# Patient Record
Sex: FEMALE | Race: WHITE | NOT HISPANIC OR LATINO | Employment: OTHER | URBAN - METROPOLITAN AREA
[De-identification: names, ages, dates, MRNs, and addresses within clinical notes are randomized per-mention and may not be internally consistent; named-entity substitution may affect disease eponyms.]

---

## 2022-01-05 DIAGNOSIS — Z11.59 ENCOUNTER FOR SCREENING FOR OTHER VIRAL DISEASES: ICD-10-CM

## 2022-01-27 RX ORDER — AMLODIPINE BESYLATE 2.5 MG/1
2.5 TABLET ORAL DAILY
Status: ON HOLD | COMMUNITY
End: 2023-02-16

## 2022-01-27 RX ORDER — LISINOPRIL 40 MG/1
40 TABLET ORAL DAILY
Status: ON HOLD | COMMUNITY
End: 2023-02-16

## 2022-01-27 RX ORDER — ATENOLOL 100 MG/1
100 TABLET ORAL DAILY
Status: ON HOLD | COMMUNITY
End: 2023-02-16

## 2022-01-28 ENCOUNTER — TRANSFERRED RECORDS (OUTPATIENT)
Dept: HEALTH INFORMATION MANAGEMENT | Facility: CLINIC | Age: 72
End: 2022-01-28
Payer: COMMERCIAL

## 2022-01-28 LAB — INR (EXTERNAL): 1.1

## 2022-01-31 ENCOUNTER — ANESTHESIA EVENT (OUTPATIENT)
Dept: SURGERY | Facility: CLINIC | Age: 72
End: 2022-01-31
Payer: COMMERCIAL

## 2022-01-31 ASSESSMENT — MIFFLIN-ST. JEOR: SCORE: 1378.64

## 2022-02-01 ENCOUNTER — HOSPITAL ENCOUNTER (OUTPATIENT)
Facility: CLINIC | Age: 72
Discharge: HOME | End: 2022-02-01
Attending: INTERNAL MEDICINE | Admitting: INTERNAL MEDICINE
Payer: COMMERCIAL

## 2022-02-01 ENCOUNTER — ANESTHESIA (OUTPATIENT)
Dept: SURGERY | Facility: CLINIC | Age: 72
End: 2022-02-01
Payer: COMMERCIAL

## 2022-02-01 VITALS
HEART RATE: 81 BPM | HEIGHT: 63 IN | DIASTOLIC BLOOD PRESSURE: 70 MMHG | TEMPERATURE: 97.5 F | SYSTOLIC BLOOD PRESSURE: 116 MMHG | OXYGEN SATURATION: 95 % | RESPIRATION RATE: 18 BRPM | WEIGHT: 192.3 LBS | BODY MASS INDEX: 34.07 KG/M2

## 2022-02-01 DIAGNOSIS — K21.00 GASTROESOPHAGEAL REFLUX DISEASE WITH ESOPHAGITIS WITHOUT HEMORRHAGE: Primary | ICD-10-CM

## 2022-02-01 LAB
COLONOSCOPY: NORMAL
UPPER GI ENDOSCOPY: NORMAL

## 2022-02-01 PROCEDURE — 250N000011 HC RX IP 250 OP 636: Performed by: NURSE ANESTHETIST, CERTIFIED REGISTERED

## 2022-02-01 PROCEDURE — 360N000075 HC SURGERY LEVEL 2, PER MIN: Performed by: INTERNAL MEDICINE

## 2022-02-01 PROCEDURE — 258N000003 HC RX IP 258 OP 636: Performed by: ANESTHESIOLOGY

## 2022-02-01 PROCEDURE — 258N000003 HC RX IP 258 OP 636: Performed by: NURSE ANESTHETIST, CERTIFIED REGISTERED

## 2022-02-01 PROCEDURE — 999N000141 HC STATISTIC PRE-PROCEDURE NURSING ASSESSMENT: Performed by: INTERNAL MEDICINE

## 2022-02-01 PROCEDURE — 710N000012 HC RECOVERY PHASE 2, PER MINUTE: Performed by: INTERNAL MEDICINE

## 2022-02-01 PROCEDURE — 272N000001 HC OR GENERAL SUPPLY STERILE: Performed by: INTERNAL MEDICINE

## 2022-02-01 PROCEDURE — 370N000017 HC ANESTHESIA TECHNICAL FEE, PER MIN: Performed by: INTERNAL MEDICINE

## 2022-02-01 PROCEDURE — 88305 TISSUE EXAM BY PATHOLOGIST: CPT | Mod: TC | Performed by: INTERNAL MEDICINE

## 2022-02-01 PROCEDURE — 250N000009 HC RX 250: Performed by: NURSE ANESTHETIST, CERTIFIED REGISTERED

## 2022-02-01 RX ORDER — SODIUM CHLORIDE, SODIUM LACTATE, POTASSIUM CHLORIDE, CALCIUM CHLORIDE 600; 310; 30; 20 MG/100ML; MG/100ML; MG/100ML; MG/100ML
INJECTION, SOLUTION INTRAVENOUS CONTINUOUS
Status: DISCONTINUED | OUTPATIENT
Start: 2022-02-01 | End: 2022-02-01 | Stop reason: HOSPADM

## 2022-02-01 RX ORDER — OMEPRAZOLE 20 MG/1
20 TABLET, DELAYED RELEASE ORAL DAILY
Qty: 30 TABLET | Refills: 1 | Status: SHIPPED | OUTPATIENT
Start: 2022-02-01 | End: 2023-02-12

## 2022-02-01 RX ORDER — PROPOFOL 10 MG/ML
INJECTION, EMULSION INTRAVENOUS CONTINUOUS PRN
Status: DISCONTINUED | OUTPATIENT
Start: 2022-02-01 | End: 2022-02-01

## 2022-02-01 RX ORDER — DEXAMETHASONE SODIUM PHOSPHATE 10 MG/ML
INJECTION, SOLUTION INTRAMUSCULAR; INTRAVENOUS PRN
Status: DISCONTINUED | OUTPATIENT
Start: 2022-02-01 | End: 2022-02-01

## 2022-02-01 RX ORDER — ONDANSETRON 2 MG/ML
INJECTION INTRAMUSCULAR; INTRAVENOUS PRN
Status: DISCONTINUED | OUTPATIENT
Start: 2022-02-01 | End: 2022-02-01

## 2022-02-01 RX ORDER — LIDOCAINE 40 MG/G
CREAM TOPICAL
Status: DISCONTINUED | OUTPATIENT
Start: 2022-02-01 | End: 2022-02-01 | Stop reason: HOSPADM

## 2022-02-01 RX ORDER — SODIUM CHLORIDE 9 MG/ML
INJECTION, SOLUTION INTRAVENOUS CONTINUOUS PRN
Status: DISCONTINUED | OUTPATIENT
Start: 2022-02-01 | End: 2022-02-01

## 2022-02-01 RX ORDER — FENTANYL CITRATE 50 UG/ML
25 INJECTION, SOLUTION INTRAMUSCULAR; INTRAVENOUS EVERY 5 MIN PRN
Status: DISCONTINUED | OUTPATIENT
Start: 2022-02-01 | End: 2022-02-01 | Stop reason: HOSPADM

## 2022-02-01 RX ORDER — PROPOFOL 10 MG/ML
INJECTION, EMULSION INTRAVENOUS PRN
Status: DISCONTINUED | OUTPATIENT
Start: 2022-02-01 | End: 2022-02-01

## 2022-02-01 RX ORDER — LIDOCAINE HYDROCHLORIDE 10 MG/ML
INJECTION, SOLUTION INFILTRATION; PERINEURAL PRN
Status: DISCONTINUED | OUTPATIENT
Start: 2022-02-01 | End: 2022-02-01

## 2022-02-01 RX ORDER — HYDROMORPHONE HCL IN WATER/PF 6 MG/30 ML
0.2 PATIENT CONTROLLED ANALGESIA SYRINGE INTRAVENOUS EVERY 5 MIN PRN
Status: DISCONTINUED | OUTPATIENT
Start: 2022-02-01 | End: 2022-02-01 | Stop reason: HOSPADM

## 2022-02-01 RX ORDER — ONDANSETRON 2 MG/ML
4 INJECTION INTRAMUSCULAR; INTRAVENOUS EVERY 30 MIN PRN
Status: DISCONTINUED | OUTPATIENT
Start: 2022-02-01 | End: 2022-02-01 | Stop reason: HOSPADM

## 2022-02-01 RX ORDER — HALOPERIDOL 5 MG/ML
1 INJECTION INTRAMUSCULAR
Status: DISCONTINUED | OUTPATIENT
Start: 2022-02-01 | End: 2022-02-01 | Stop reason: HOSPADM

## 2022-02-01 RX ORDER — FENTANYL CITRATE 50 UG/ML
25 INJECTION, SOLUTION INTRAMUSCULAR; INTRAVENOUS
Status: CANCELLED | OUTPATIENT
Start: 2022-02-01

## 2022-02-01 RX ORDER — ONDANSETRON 4 MG/1
4 TABLET, ORALLY DISINTEGRATING ORAL EVERY 30 MIN PRN
Status: DISCONTINUED | OUTPATIENT
Start: 2022-02-01 | End: 2022-02-01 | Stop reason: HOSPADM

## 2022-02-01 RX ORDER — MEPERIDINE HYDROCHLORIDE 25 MG/ML
12.5 INJECTION INTRAMUSCULAR; INTRAVENOUS; SUBCUTANEOUS
Status: DISCONTINUED | OUTPATIENT
Start: 2022-02-01 | End: 2022-02-01 | Stop reason: HOSPADM

## 2022-02-01 RX ORDER — OXYCODONE HYDROCHLORIDE 5 MG/1
5 TABLET ORAL EVERY 4 HOURS PRN
Status: DISCONTINUED | OUTPATIENT
Start: 2022-02-01 | End: 2022-02-01 | Stop reason: HOSPADM

## 2022-02-01 RX ADMIN — SODIUM CHLORIDE, POTASSIUM CHLORIDE, SODIUM LACTATE AND CALCIUM CHLORIDE: 600; 310; 30; 20 INJECTION, SOLUTION INTRAVENOUS at 07:21

## 2022-02-01 RX ADMIN — PHENYLEPHRINE HYDROCHLORIDE 50 MCG: 10 INJECTION INTRAVENOUS at 08:13

## 2022-02-01 RX ADMIN — LIDOCAINE HYDROCHLORIDE 50 MG: 10 INJECTION, SOLUTION INFILTRATION; PERINEURAL at 07:30

## 2022-02-01 RX ADMIN — PHENYLEPHRINE HYDROCHLORIDE 50 MCG: 10 INJECTION INTRAVENOUS at 07:59

## 2022-02-01 RX ADMIN — DEXAMETHASONE SODIUM PHOSPHATE 10 MG: 10 INJECTION, SOLUTION INTRAMUSCULAR; INTRAVENOUS at 07:40

## 2022-02-01 RX ADMIN — SODIUM CHLORIDE: 9 INJECTION, SOLUTION INTRAVENOUS at 08:27

## 2022-02-01 RX ADMIN — PROPOFOL 150 MCG/KG/MIN: 10 INJECTION, EMULSION INTRAVENOUS at 07:35

## 2022-02-01 RX ADMIN — PHENYLEPHRINE HYDROCHLORIDE 50 MCG: 10 INJECTION INTRAVENOUS at 08:21

## 2022-02-01 RX ADMIN — PROPOFOL 20 MG: 10 INJECTION, EMULSION INTRAVENOUS at 07:41

## 2022-02-01 RX ADMIN — PHENYLEPHRINE HYDROCHLORIDE 50 MCG: 10 INJECTION INTRAVENOUS at 07:55

## 2022-02-01 RX ADMIN — ONDANSETRON 4 MG: 2 INJECTION INTRAMUSCULAR; INTRAVENOUS at 07:27

## 2022-02-01 ASSESSMENT — MIFFLIN-ST. JEOR: SCORE: 1366.4

## 2022-02-01 NOTE — ANESTHESIA POSTPROCEDURE EVALUATION
Patient: Florence Murray    Procedure: Procedure(s):  ESOPHAGOGASTRODUODENOSCOPY WITH BIOPSIES.  WITH COLONOSCOPY WITH POLYPECTOMY.       Diagnosis:Gastrointestinal hemorrhage [K92.2]  Diverticulitis [K57.92]  Diagnosis Additional Information: No value filed.    Anesthesia Type:  MAC    Note:  Disposition: Outpatient   Postop Pain Control: Uneventful            Sign Out: Well controlled pain   PONV: No   Neuro/Psych: Uneventful            Sign Out: Acceptable/Baseline neuro status   Airway/Respiratory: Uneventful            Sign Out: Acceptable/Baseline resp. status   CV/Hemodynamics: Uneventful            Sign Out: Acceptable CV status; No obvious hypovolemia; No obvious fluid overload   Other NRE: NONE   DID A NON-ROUTINE EVENT OCCUR? No           Last vitals:  Vitals Value Taken Time   /66 02/01/22 0900   Temp 36.4  C (97.5  F) 02/01/22 0840   Pulse 81 02/01/22 0908   Resp 16 02/01/22 0840   SpO2 95 % 02/01/22 0908   Vitals shown include unvalidated device data.    Electronically Signed By: Dionte Smith MD  February 1, 2022  9:18 AM

## 2022-02-01 NOTE — ANESTHESIA CARE TRANSFER NOTE
Patient: Florence Murray    Procedure: Procedure(s):  ESOPHAGOGASTRODUODENOSCOPY WITH BIOPSIES.  WITH COLONOSCOPY WITH POLYPECTOMY.       Diagnosis: Gastrointestinal hemorrhage [K92.2]  Diverticulitis [K57.92]  Diagnosis Additional Information: No value filed.    Anesthesia Type:   MAC     Note:    Oropharynx: oropharynx clear of all foreign objects  Level of Consciousness: awake  Oxygen Supplementation: face mask  Level of Supplemental Oxygen (L/min / FiO2): 6  Independent Airway: airway patency satisfactory and stable  Dentition: dentition unchanged  Vital Signs Stable: post-procedure vital signs reviewed and stable  Report to RN Given: handoff report given  Patient transferred to: PACU    Handoff Report: Identifed the Patient, Identified the Reponsible Provider, Reviewed the pertinent medical history, Discussed the surgical course, Reviewed Intra-OP anesthesia mangement and issues during anesthesia, Set expectations for post-procedure period and Allowed opportunity for questions and acknowledgement of understanding      Vitals:  Vitals Value Taken Time   /62 02/01/22 0840   Temp 36.4  C (97.5  F) 02/01/22 0840   Pulse 75 02/01/22 0842   Resp 16 02/01/22 0840   SpO2 100 % 02/01/22 0842   Vitals shown include unvalidated device data.    Electronically Signed By: JAYME Bear CRNA  February 1, 2022  8:44 AM

## 2022-02-01 NOTE — ANESTHESIA PREPROCEDURE EVALUATION
Anesthesia Pre-Procedure Evaluation    Patient: Florence Murray   MRN: [id number] : 1950        Preoperative Diagnosis: Gastrointestinal hemorrhage [K92.2]  Diverticulitis [K57.92]    Procedure : Procedure(s):  ESOPHAGOGASTRODUODENOSCOPY  WITH COLONOSCOPY          Past Medical History:   Diagnosis Date     Antiplatelet or antithrombotic long-term use      Arrhythmia      Cerebral artery occlusion with cerebral infarction (H)     2021     Hypertension      Irregular heart beat       Past Surgical History:   Procedure Laterality Date     ORTHOPEDIC SURGERY       ZZ CARDIOLOGY DRAKE OCCLUDER REFERRAL        Allergies   Allergen Reactions     Nickel Dermatitis     Sulfa Drugs Nausea and Vomiting     Ultram [Tramadol] Nausea and Vomiting     Dyazide Rash      Social History     Tobacco Use     Smoking status: Never Smoker     Smokeless tobacco: Never Used   Substance Use Topics     Alcohol use: Not Currently      Wt Readings from Last 1 Encounters:   22 87.2 kg (192 lb 4.8 oz)        Anesthesia Evaluation   Pt has had prior anesthetic. Type: MAC and General.    No history of anesthetic complications       ROS/MED HX  ENT/Pulmonary:  - neg pulmonary ROS     Neurologic:     (+) CVA, date: ,     Cardiovascular:     (+) hypertension-----Taking blood thinners JACKSON. Irregular Heartbeat/Palpitations,     METS/Exercise Tolerance:     Hematologic:     (+) anemia,     Musculoskeletal:  - neg musculoskeletal ROS     GI/Hepatic:     (+) bowel prep,     Renal/Genitourinary:  - neg Renal ROS     Endo:  - neg endo ROS     Psychiatric/Substance Use:  - neg psychiatric ROS  (-) psychiatric history   Infectious Disease:  - neg infectious disease ROS     Malignancy:  - neg malignancy ROS     Other:  - neg other ROS          Physical Exam    Airway  airway exam normal      Mallampati: I   TM distance: > 3 FB   Neck ROM: full     Respiratory Devices and Support         Dental  no notable dental history          Cardiovascular   cardiovascular exam normal       Rhythm and rate: regular and normal     Pulmonary   pulmonary exam normal        breath sounds clear to auscultation           OUTSIDE LABS:  CBC: No results found for: WBC, HGB, HCT, PLT  BMP: No results found for: NA, POTASSIUM, CHLORIDE, CO2, BUN, CR, GLC  COAGS: No results found for: PTT, INR, FIBR  POC: No results found for: BGM, HCG, HCGS  HEPATIC: No results found for: ALBUMIN, PROTTOTAL, ALT, AST, GGT, ALKPHOS, BILITOTAL, BILIDIRECT, REYMUNDO  OTHER: No results found for: PH, LACT, A1C, ARTHUR, PHOS, MAG, LIPASE, AMYLASE, TSH, T4, T3, CRP, SED    Anesthesia Plan    ASA Status:  3   NPO Status:  NPO Appropriate    Anesthesia Type: MAC.     - Reason for MAC: straight local not clinically adequate              Consents    Anesthesia Plan(s) and associated risks, benefits, and realistic alternatives discussed. Questions answered and patient/representative(s) expressed understanding.    - Discussed:     - Discussed with:  Patient, Spouse      - Extended Intubation/Ventilatory Support Discussed: No.      - Patient is DNR/DNI Status: No    Use of blood products discussed: No .     Postoperative Care       PONV prophylaxis: Ondansetron (or other 5HT-3), Background Propofol Infusion, Dexamethasone or Solumedrol     Comments:                Dionte Smith MD

## 2022-02-02 LAB
PATH REPORT.COMMENTS IMP SPEC: NORMAL
PATH REPORT.FINAL DX SPEC: NORMAL
PATH REPORT.GROSS SPEC: NORMAL
PATH REPORT.MICROSCOPIC SPEC OTHER STN: NORMAL
PATH REPORT.RELEVANT HX SPEC: NORMAL
PHOTO IMAGE: NORMAL

## 2022-02-02 PROCEDURE — 88342 IMHCHEM/IMCYTCHM 1ST ANTB: CPT | Mod: 26 | Performed by: PATHOLOGY

## 2022-02-02 PROCEDURE — 88305 TISSUE EXAM BY PATHOLOGIST: CPT | Mod: 26 | Performed by: PATHOLOGY

## 2022-02-26 ENCOUNTER — HEALTH MAINTENANCE LETTER (OUTPATIENT)
Age: 72
End: 2022-02-26

## 2022-10-30 ENCOUNTER — HEALTH MAINTENANCE LETTER (OUTPATIENT)
Age: 72
End: 2022-10-30

## 2023-02-12 ENCOUNTER — APPOINTMENT (OUTPATIENT)
Dept: CT IMAGING | Facility: CLINIC | Age: 73
DRG: 481 | End: 2023-02-12
Attending: EMERGENCY MEDICINE
Payer: COMMERCIAL

## 2023-02-12 ENCOUNTER — APPOINTMENT (OUTPATIENT)
Dept: GENERAL RADIOLOGY | Facility: CLINIC | Age: 73
DRG: 481 | End: 2023-02-12
Attending: EMERGENCY MEDICINE
Payer: COMMERCIAL

## 2023-02-12 ENCOUNTER — HOSPITAL ENCOUNTER (INPATIENT)
Facility: CLINIC | Age: 73
LOS: 4 days | Discharge: NURSING, REHAB OR LONG-TERM CARE | DRG: 481 | End: 2023-02-16
Attending: EMERGENCY MEDICINE | Admitting: STUDENT IN AN ORGANIZED HEALTH CARE EDUCATION/TRAINING PROGRAM
Payer: COMMERCIAL
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
## 2023-02-12 DIAGNOSIS — Z79.52 CURRENT USE OF STEROID MEDICATION: ICD-10-CM

## 2023-02-12 DIAGNOSIS — L30.9 DERMATITIS: ICD-10-CM

## 2023-02-12 DIAGNOSIS — K59.00 CONSTIPATION, UNSPECIFIED CONSTIPATION TYPE: ICD-10-CM

## 2023-02-12 DIAGNOSIS — I10 BENIGN ESSENTIAL HYPERTENSION: Primary | ICD-10-CM

## 2023-02-12 DIAGNOSIS — S72.402A CLOSED FRACTURE OF DISTAL END OF LEFT FEMUR, UNSPECIFIED FRACTURE MORPHOLOGY, INITIAL ENCOUNTER (H): ICD-10-CM

## 2023-02-12 LAB
ABO/RH(D): NORMAL
ANION GAP SERPL CALCULATED.3IONS-SCNC: 9 MMOL/L (ref 7–15)
ANTIBODY SCREEN: NEGATIVE
BASOPHILS # BLD AUTO: 0 10E3/UL (ref 0–0.2)
BASOPHILS NFR BLD AUTO: 0 %
BUN SERPL-MCNC: 25.5 MG/DL (ref 8–23)
CALCIUM SERPL-MCNC: 8.6 MG/DL (ref 8.8–10.2)
CHLORIDE SERPL-SCNC: 106 MMOL/L (ref 98–107)
CREAT SERPL-MCNC: 0.71 MG/DL (ref 0.51–0.95)
DEPRECATED HCO3 PLAS-SCNC: 23 MMOL/L (ref 22–29)
EOSINOPHIL # BLD AUTO: 0.1 10E3/UL (ref 0–0.7)
EOSINOPHIL NFR BLD AUTO: 1 %
ERYTHROCYTE [DISTWIDTH] IN BLOOD BY AUTOMATED COUNT: 15.7 % (ref 10–15)
GFR SERPL CREATININE-BSD FRML MDRD: >90 ML/MIN/1.73M2
GLUCOSE SERPL-MCNC: 103 MG/DL (ref 70–99)
HCT VFR BLD AUTO: 38 % (ref 35–47)
HGB BLD-MCNC: 12 G/DL (ref 11.7–15.7)
IMM GRANULOCYTES # BLD: 0.1 10E3/UL
IMM GRANULOCYTES NFR BLD: 1 %
LYMPHOCYTES # BLD AUTO: 1.8 10E3/UL (ref 0.8–5.3)
LYMPHOCYTES NFR BLD AUTO: 16 %
MAGNESIUM SERPL-MCNC: 1.8 MG/DL (ref 1.7–2.3)
MCH RBC QN AUTO: 26.3 PG (ref 26.5–33)
MCHC RBC AUTO-ENTMCNC: 31.6 G/DL (ref 31.5–36.5)
MCV RBC AUTO: 83 FL (ref 78–100)
MONOCYTES # BLD AUTO: 0.9 10E3/UL (ref 0–1.3)
MONOCYTES NFR BLD AUTO: 8 %
NEUTROPHILS # BLD AUTO: 8.3 10E3/UL (ref 1.6–8.3)
NEUTROPHILS NFR BLD AUTO: 74 %
NRBC # BLD AUTO: 0 10E3/UL
NRBC BLD AUTO-RTO: 0 /100
PLATELET # BLD AUTO: 271 10E3/UL (ref 150–450)
POTASSIUM SERPL-SCNC: 3.9 MMOL/L (ref 3.4–5.3)
RBC # BLD AUTO: 4.56 10E6/UL (ref 3.8–5.2)
SODIUM SERPL-SCNC: 138 MMOL/L (ref 136–145)
SPECIMEN EXPIRATION DATE: NORMAL
WBC # BLD AUTO: 11.2 10E3/UL (ref 4–11)

## 2023-02-12 PROCEDURE — 99285 EMERGENCY DEPT VISIT HI MDM: CPT | Mod: 25

## 2023-02-12 PROCEDURE — 99222 1ST HOSP IP/OBS MODERATE 55: CPT | Mod: AI | Performed by: PHYSICIAN ASSISTANT

## 2023-02-12 PROCEDURE — 80048 BASIC METABOLIC PNL TOTAL CA: CPT | Performed by: EMERGENCY MEDICINE

## 2023-02-12 PROCEDURE — 250N000013 HC RX MED GY IP 250 OP 250 PS 637: Performed by: INTERNAL MEDICINE

## 2023-02-12 PROCEDURE — 258N000003 HC RX IP 258 OP 636: Performed by: PHYSICIAN ASSISTANT

## 2023-02-12 PROCEDURE — 73552 X-RAY EXAM OF FEMUR 2/>: CPT | Mod: LT

## 2023-02-12 PROCEDURE — 86901 BLOOD TYPING SEROLOGIC RH(D): CPT | Performed by: EMERGENCY MEDICINE

## 2023-02-12 PROCEDURE — 86850 RBC ANTIBODY SCREEN: CPT | Performed by: EMERGENCY MEDICINE

## 2023-02-12 PROCEDURE — 120N000001 HC R&B MED SURG/OB

## 2023-02-12 PROCEDURE — 250N000011 HC RX IP 250 OP 636: Performed by: PHYSICIAN ASSISTANT

## 2023-02-12 PROCEDURE — 250N000013 HC RX MED GY IP 250 OP 250 PS 637: Performed by: EMERGENCY MEDICINE

## 2023-02-12 PROCEDURE — 83735 ASSAY OF MAGNESIUM: CPT | Performed by: PHYSICIAN ASSISTANT

## 2023-02-12 PROCEDURE — 96374 THER/PROPH/DIAG INJ IV PUSH: CPT

## 2023-02-12 PROCEDURE — 250N000011 HC RX IP 250 OP 636: Performed by: EMERGENCY MEDICINE

## 2023-02-12 PROCEDURE — 96376 TX/PRO/DX INJ SAME DRUG ADON: CPT

## 2023-02-12 PROCEDURE — 250N000013 HC RX MED GY IP 250 OP 250 PS 637: Performed by: PHYSICIAN ASSISTANT

## 2023-02-12 PROCEDURE — 85025 COMPLETE CBC W/AUTO DIFF WBC: CPT | Performed by: EMERGENCY MEDICINE

## 2023-02-12 PROCEDURE — 72170 X-RAY EXAM OF PELVIS: CPT

## 2023-02-12 PROCEDURE — 36415 COLL VENOUS BLD VENIPUNCTURE: CPT | Performed by: EMERGENCY MEDICINE

## 2023-02-12 PROCEDURE — 70450 CT HEAD/BRAIN W/O DYE: CPT

## 2023-02-12 PROCEDURE — 93005 ELECTROCARDIOGRAM TRACING: CPT

## 2023-02-12 PROCEDURE — 96375 TX/PRO/DX INJ NEW DRUG ADDON: CPT

## 2023-02-12 RX ORDER — HYDROMORPHONE HCL IN WATER/PF 6 MG/30 ML
0.4 PATIENT CONTROLLED ANALGESIA SYRINGE INTRAVENOUS
Status: DISCONTINUED | OUTPATIENT
Start: 2023-02-12 | End: 2023-02-14

## 2023-02-12 RX ORDER — HYDROXYZINE HYDROCHLORIDE 25 MG/1
25-50 TABLET, FILM COATED ORAL AT BEDTIME
COMMUNITY
Start: 2023-01-24

## 2023-02-12 RX ORDER — SODIUM CHLORIDE 9 MG/ML
INJECTION, SOLUTION INTRAVENOUS CONTINUOUS
Status: DISCONTINUED | OUTPATIENT
Start: 2023-02-12 | End: 2023-02-13

## 2023-02-12 RX ORDER — HYDROMORPHONE HYDROCHLORIDE 1 MG/ML
0.5 INJECTION, SOLUTION INTRAMUSCULAR; INTRAVENOUS; SUBCUTANEOUS ONCE
Status: COMPLETED | OUTPATIENT
Start: 2023-02-12 | End: 2023-02-12

## 2023-02-12 RX ORDER — DIPHENHYDRAMINE HCL 25 MG
25 TABLET ORAL
COMMUNITY

## 2023-02-12 RX ORDER — HYDROMORPHONE HYDROCHLORIDE 1 MG/ML
0.5 INJECTION, SOLUTION INTRAMUSCULAR; INTRAVENOUS; SUBCUTANEOUS EVERY 30 MIN PRN
Status: DISCONTINUED | OUTPATIENT
Start: 2023-02-12 | End: 2023-02-12

## 2023-02-12 RX ORDER — AMOXICILLIN 250 MG
2 CAPSULE ORAL 2 TIMES DAILY
Status: DISCONTINUED | OUTPATIENT
Start: 2023-02-12 | End: 2023-02-12

## 2023-02-12 RX ORDER — LIDOCAINE 40 MG/G
CREAM TOPICAL
Status: DISCONTINUED | OUTPATIENT
Start: 2023-02-12 | End: 2023-02-16 | Stop reason: HOSPADM

## 2023-02-12 RX ORDER — AMOXICILLIN 250 MG
1 CAPSULE ORAL 2 TIMES DAILY PRN
Status: DISCONTINUED | OUTPATIENT
Start: 2023-02-12 | End: 2023-02-16 | Stop reason: HOSPADM

## 2023-02-12 RX ORDER — HYDROXYZINE HYDROCHLORIDE 25 MG/1
25-50 TABLET, FILM COATED ORAL AT BEDTIME
Status: DISCONTINUED | OUTPATIENT
Start: 2023-02-12 | End: 2023-02-16 | Stop reason: HOSPADM

## 2023-02-12 RX ORDER — AMOXICILLIN 250 MG
1 CAPSULE ORAL 2 TIMES DAILY
Status: DISCONTINUED | OUTPATIENT
Start: 2023-02-12 | End: 2023-02-12

## 2023-02-12 RX ORDER — ACETAMINOPHEN 325 MG/1
650 TABLET ORAL EVERY 6 HOURS PRN
Status: DISCONTINUED | OUTPATIENT
Start: 2023-02-12 | End: 2023-02-13

## 2023-02-12 RX ORDER — PREDNISONE 5 MG/1
TABLET ORAL
Status: ON HOLD | COMMUNITY
Start: 2023-02-03 | End: 2023-02-16

## 2023-02-12 RX ORDER — OXYCODONE HYDROCHLORIDE 5 MG/1
5 TABLET ORAL EVERY 4 HOURS PRN
Status: DISCONTINUED | OUTPATIENT
Start: 2023-02-12 | End: 2023-02-14

## 2023-02-12 RX ORDER — ONDANSETRON 2 MG/ML
4 INJECTION INTRAMUSCULAR; INTRAVENOUS EVERY 6 HOURS PRN
Status: DISCONTINUED | OUTPATIENT
Start: 2023-02-12 | End: 2023-02-13

## 2023-02-12 RX ORDER — ACETAMINOPHEN 650 MG/1
650 SUPPOSITORY RECTAL EVERY 6 HOURS PRN
Status: DISCONTINUED | OUTPATIENT
Start: 2023-02-12 | End: 2023-02-13

## 2023-02-12 RX ORDER — SERTRALINE HYDROCHLORIDE 25 MG/1
25 TABLET, FILM COATED ORAL EVERY MORNING
COMMUNITY
Start: 2023-01-28

## 2023-02-12 RX ORDER — ATENOLOL 50 MG/1
100 TABLET ORAL AT BEDTIME
Status: DISCONTINUED | OUTPATIENT
Start: 2023-02-12 | End: 2023-02-16 | Stop reason: HOSPADM

## 2023-02-12 RX ORDER — ONDANSETRON 4 MG/1
4 TABLET, ORALLY DISINTEGRATING ORAL EVERY 6 HOURS PRN
Status: DISCONTINUED | OUTPATIENT
Start: 2023-02-12 | End: 2023-02-13

## 2023-02-12 RX ORDER — SERTRALINE HYDROCHLORIDE 25 MG/1
25 TABLET, FILM COATED ORAL EVERY MORNING
Status: DISCONTINUED | OUTPATIENT
Start: 2023-02-13 | End: 2023-02-16 | Stop reason: HOSPADM

## 2023-02-12 RX ORDER — ACETAMINOPHEN 500 MG
1000 TABLET ORAL ONCE
Status: COMPLETED | OUTPATIENT
Start: 2023-02-12 | End: 2023-02-12

## 2023-02-12 RX ORDER — ONDANSETRON 2 MG/ML
4 INJECTION INTRAMUSCULAR; INTRAVENOUS ONCE
Status: COMPLETED | OUTPATIENT
Start: 2023-02-12 | End: 2023-02-12

## 2023-02-12 RX ORDER — HYDROMORPHONE HCL IN WATER/PF 6 MG/30 ML
0.2 PATIENT CONTROLLED ANALGESIA SYRINGE INTRAVENOUS
Status: DISCONTINUED | OUTPATIENT
Start: 2023-02-12 | End: 2023-02-14

## 2023-02-12 RX ADMIN — HYDROMORPHONE HYDROCHLORIDE 0.2 MG: 0.2 INJECTION, SOLUTION INTRAMUSCULAR; INTRAVENOUS; SUBCUTANEOUS at 15:32

## 2023-02-12 RX ADMIN — OXYCODONE HYDROCHLORIDE 5 MG: 5 TABLET ORAL at 16:39

## 2023-02-12 RX ADMIN — SODIUM CHLORIDE: 9 INJECTION, SOLUTION INTRAVENOUS at 16:30

## 2023-02-12 RX ADMIN — HYDROMORPHONE HYDROCHLORIDE 0.5 MG: 1 INJECTION, SOLUTION INTRAMUSCULAR; INTRAVENOUS; SUBCUTANEOUS at 10:57

## 2023-02-12 RX ADMIN — ONDANSETRON 4 MG: 2 INJECTION INTRAMUSCULAR; INTRAVENOUS at 17:34

## 2023-02-12 RX ADMIN — ACETAMINOPHEN 1000 MG: 500 TABLET ORAL at 13:31

## 2023-02-12 RX ADMIN — HYDROMORPHONE HYDROCHLORIDE 0.4 MG: 0.2 INJECTION, SOLUTION INTRAMUSCULAR; INTRAVENOUS; SUBCUTANEOUS at 17:35

## 2023-02-12 RX ADMIN — ACETAMINOPHEN 650 MG: 325 TABLET ORAL at 16:38

## 2023-02-12 RX ADMIN — HYDROMORPHONE HYDROCHLORIDE 0.5 MG: 1 INJECTION, SOLUTION INTRAMUSCULAR; INTRAVENOUS; SUBCUTANEOUS at 12:58

## 2023-02-12 RX ADMIN — ONDANSETRON 4 MG: 2 INJECTION INTRAMUSCULAR; INTRAVENOUS at 11:53

## 2023-02-12 RX ADMIN — HYDROXYZINE HYDROCHLORIDE 25 MG: 25 TABLET ORAL at 21:55

## 2023-02-12 RX ADMIN — OXYCODONE HYDROCHLORIDE 5 MG: 5 TABLET ORAL at 21:56

## 2023-02-12 RX ADMIN — ATENOLOL 100 MG: 50 TABLET ORAL at 21:55

## 2023-02-12 ASSESSMENT — ACTIVITIES OF DAILY LIVING (ADL)
WALKING_OR_CLIMBING_STAIRS_DIFFICULTY: NO
ADLS_ACUITY_SCORE: 35
ADLS_ACUITY_SCORE: 35
ADLS_ACUITY_SCORE: 20
WEAR_GLASSES_OR_BLIND: YES
ADLS_ACUITY_SCORE: 32
FALL_HISTORY_WITHIN_LAST_SIX_MONTHS: NO
DIFFICULTY_EATING/SWALLOWING: NO
ADLS_ACUITY_SCORE: 36
DOING_ERRANDS_INDEPENDENTLY_DIFFICULTY: NO
CHANGE_IN_FUNCTIONAL_STATUS_SINCE_ONSET_OF_CURRENT_ILLNESS/INJURY: NO
ADLS_ACUITY_SCORE: 35
ADLS_ACUITY_SCORE: 32
CONCENTRATING,_REMEMBERING_OR_MAKING_DECISIONS_DIFFICULTY: NO
DRESSING/BATHING_DIFFICULTY: NO
TOILETING_ISSUES: NO

## 2023-02-12 ASSESSMENT — ENCOUNTER SYMPTOMS
MYALGIAS: 1
ARTHRALGIAS: 1
NECK PAIN: 0

## 2023-02-12 NOTE — H&P
[facility]  Internal Medicine  History and Physical      Patient Name: Florence Murray MRN# [id number]   Age: 72 year old YOB: 1950     Date of Admission:2/12/2023    Primary care provider: Yandel Corey  Date of Service: 2/12/2023         Assessment and Plan:   Florence Murray is a 72 year old female with a history of HTN, RLS, CVA, Afib, left TKA 2014 who presents to the ED with LLE pain after a fall.    Left distal femur periprosthetic fracture - s/p mechanical fall on the ice.  Imaging reveals a displaced distal femur fracture above her total knee arthroplasty with questionable slight comminution.  - Orthopedic Surgery consulted and plan for operative repair  - analgesics prn  - PT/OT/SW consults    Chronic Atrial Fibrillation - diagnosed 2018 s/p left atrial appendage occlusion on 3/2/20.    - continue Atenolol.   - obtain preop EKG.   - monitor on telemetry  - Hold Eliquis prior to surgery.    Hx CVA - hx of CVA 2018 and 2021. Now on Eliquis.     HTN - continue Amlodipine and Lisinopril    HLD - continue Rosuvastatin    Anxiety - continue Sertraline    Dermatitis - followed by Dermatology and is currently on a Prednisone taper for about 2 more weeks.  Awaiting formal pharmacy med rec and will continue Prednisone.      CODE: full  Diet/IVF: npo  DVT ppx: scd    Susan Longoria MS RAMÍREZ  Physician Assistant   Hospitalist Service  Pager: [phone]    Awaiting formal med rec         Chief Complaint:   LLE pain         HPI:   72 year old female with a history of HTN, RLS, CVA, Afib who presents to the ED with LLE pain after a fall.    Patient reports she was walking in the park with her family today when she slipped on the ice and fell onto her left side in a twisting motion.  She had immediate pain in her left leg and pain with any movement.  She denies hitting her head or loc.  She denies sustaining any other injuries.         Past Medical History:     Past Medical History:    Diagnosis Date     Antiplatelet or antithrombotic long-term use      Arrhythmia      Cerebral artery occlusion with cerebral infarction (H)     11/22/2021     Hypertension      Irregular heart beat           Past Surgical History:     Past Surgical History:   Procedure Laterality Date     COLONOSCOPY N/A 2/1/2022    Procedure: WITH COLONOSCOPY WITH POLYPECTOMY.;  Surgeon: Yvette Stokes MD;  Location: [facility] OR     ESOPHAGOSCOPY, GASTROSCOPY, DUODENOSCOPY (EGD), COMBINED N/A 2/1/2022    Procedure: ESOPHAGOGASTRODUODENOSCOPY WITH BIOPSIES.;  Surgeon: Yvette Stokes MD;  Location: [facility] OR     ORTHOPEDIC SURGERY       Z CARDIOLOGY DRAKE OCCLUDER REFERRAL            Social History:     Social History     Socioeconomic History     Marital status:      Spouse name: Not on file     Number of children: Not on file     Years of education: Not on file     Highest education level: Not on file   Occupational History     Not on file   Tobacco Use     Smoking status: Never     Smokeless tobacco: Never   Substance and Sexual Activity     Alcohol use: Not Currently     Drug use: Never     Sexual activity: Not on file   Other Topics Concern     Parent/sibling w/ CABG, MI or angioplasty before 65F 55M? Not Asked   Social History Narrative     Not on file     Social Determinants of Health     Financial Resource Strain: Not on file   Food Insecurity: Not on file   Transportation Needs: Not on file   Physical Activity: Not on file   Stress: Not on file   Social Connections: Not on file   Intimate Partner Violence: Not on file   Housing Stability: Not on file          Family History:   No family history on file.       Allergies:      Allergies   Allergen Reactions     Nickel Dermatitis     Sulfa Drugs Nausea and Vomiting     Ultram [Tramadol] Nausea and Vomiting     Dyazide Rash          Medications:     Prior to Admission medications    Medication Sig Last Dose Taking? Auth Provider Long Term  "End Date   amLODIPine (NORVASC) 2.5 MG tablet Take 2.5 mg by mouth daily   Reported, Patient Yes    apixaban ANTICOAGULANT (ELIQUIS) 5 MG tablet Take 5 mg by mouth 2 times daily   Reported, Patient     atenolol (TENORMIN) 100 MG tablet Take 100 mg by mouth daily   Reported, Patient Yes    lisinopril (ZESTRIL) 40 MG tablet Take 40 mg by mouth daily   Reported, Patient Yes    sertraline (ZOLOFT) 25 MG tablet Take 25 mg by mouth every morning   Reported, Patient Yes           Review of Systems:   A complete ROS was performed and is negative other than what is stated in the HPI.       Physical Exam:   Blood pressure (!) 141/96, pulse 75, temperature 97.6  F (36.4  C), temperature source Oral, resp. rate 20, height 1.575 m (5' 2\"), weight 86.2 kg (190 lb), SpO2 99 %.  General: Alert, interactive, NAD  HEENT: AT/NC  Chest/Resp: clear to auscultation bilaterally, no crackles or wheezes  Heart/CV: regular rate and rhythm, no murmur  Abdomen/GI: Soft, nontender, nondistended. +BS.  No rebound or guarding.  Extremities/MSK: No LE edema, LLE shortened and rotated.  Skin: Warm and dry  Neuro: Alert & oriented x 3         Labs:   ROUTINE ICU LABS (Last four results)  CMP  Recent Labs   Lab 02/12/23  1055      POTASSIUM 3.9   CHLORIDE 106   CO2 23   ANIONGAP 9   *   BUN 25.5*   CR 0.71   GFRESTIMATED >90   ARTHUR 8.6*     CBC  Recent Labs   Lab 02/12/23  1055   WBC 11.2*   RBC 4.56   HGB 12.0   HCT 38.0   MCV 83   MCH 26.3*   MCHC 31.6   RDW 15.7*        INRNo lab results found in last 7 days.  Arterial Blood GasNo lab results found in last 7 days.       Imaging/Procedures:     Results for orders placed or performed during the hospital encounter of 02/12/23   Head CT w/o contrast    Narrative    EXAM: CT HEAD W/O CONTRAST  LOCATION: [facility]  DATE/TIME: 2/12/2023 11:28 AM    INDICATION: fall, on eliquis. Trauma, injury.  COMPARISON: None.  TECHNIQUE: Routine CT Head without IV contrast. " Multiplanar reformats. Dose reduction techniques were used.    FINDINGS:  INTRACRANIAL CONTENTS: No intracranial hemorrhage. Mild diffuse cerebral parenchymal volume loss. Moderate-sized area of hypodensity and associated volume loss in the posterior right temporal lobe, the right temporo-occipital region and the anterior   right parietal lobe. Small chronic infarct in the right cerebellar hemisphere. No midline shift. The basilar cisterns are patent. No CT evidence for an acute infarct. Mild periventricular and scattered foci of deep white matter hypodensities involving   both cerebral hemispheres.    VISUALIZED ORBITS/SINUSES/MASTOIDS: Prior bilateral cataract surgery. Visualized portions of the orbits are otherwise unremarkable. No paranasal sinus mucosal disease. No middle ear or mastoid effusion.    BONES/SOFT TISSUES: No acute abnormality.      Impression    IMPRESSION:  1.  No intracranial hemorrhage.  2.  Moderate-sized area of hypodensity and associated volume loss in the posterior right temporal lobe, the right temporo-occipital region and the anterior right parietal lobe. These likely represent areas of previous ischemic insult. If clinically   indicated, areas of superimposed subacute or more recent infarct can be determined with MRI.  3.  Small chronic infarct in the right cerebellar hemisphere.  4.  Mild diffuse cerebral parenchymal volume loss. Presumed chronic hypertensive/microvascular ischemic white matter changes.    XR Pelvis 1/2 Views    Narrative    EXAM: XR PELVIS 1/2 VIEWS, XR FEMUR LEFT 2 VIEWS  LOCATION: [facility]  DATE/TIME: 2/12/2023 11:50 AM    INDICATION: Fall, L thigh pain  COMPARISON: None.      Impression    IMPRESSION:AP view of the pelvis shows no acute displaced fracture or dislocation. Mild degenerative changes of both hips.    Acute, displaced left distal femoral periprosthetic fracture about the left total knee replacement. Associated soft tissue  swelling and joint effusion.    No proximal femoral fracture. Bones are demineralized.    NOTE: ABNORMAL REPORT    THE DICTATION ABOVE DESCRIBES AN ABNORMALITY FOR WHICH FOLLOW-UP IS NEEDED.    XR Femur Left 2 Views    Narrative    EXAM: XR PELVIS 1/2 VIEWS, XR FEMUR LEFT 2 VIEWS  LOCATION: [facility]  DATE/TIME: 2/12/2023 11:50 AM    INDICATION: Fall, L thigh pain  COMPARISON: None.      Impression    IMPRESSION:AP view of the pelvis shows no acute displaced fracture or dislocation. Mild degenerative changes of both hips.    Acute, displaced left distal femoral periprosthetic fracture about the left total knee replacement. Associated soft tissue swelling and joint effusion.    No proximal femoral fracture. Bones are demineralized.    NOTE: ABNORMAL REPORT    THE DICTATION ABOVE DESCRIBES AN ABNORMALITY FOR WHICH FOLLOW-UP IS NEEDED.

## 2023-02-12 NOTE — ED TRIAGE NOTES
Uncertain Causes of Abdominal Pain (Male)  Based on your visit today, the exact cause of your abdominal pain is not clear. Your exam and tests do not suggest a dangerous cause at this time. However, the signs of a serious problem may take more time to appear. Although your evaluation was reassuring today, sometimes early in the course of many conditions, exam and lab tests can appear normal. Therefore, it is important for you to watch for any new symptoms or worsening of your condition.  It may not be obvious what caused your symptoms. Pay attention to things that do seem to make your symptoms worse or better and discuss this with your doctor when you follow up.  The evaluation of abdominal pain in the emergency department may only require an exam by the doctor or it may include blood, urine or imaging studies, depending on many factors. Sometimes exams and tests can identify a cause but in many cases, a clear cause is not found. Further testing at follow up visits may help to suggest a clear diagnosis.  Home care  · Rest as much as you can until your next exam.  · Try to avoid any medicines (unless otherwise directed by your doctor), foods, activities, or other factors that may have contributed to your symptoms.  · Try to eat foods that you know that you have tolerated well in the past. Certain diets may be recommended for some conditions that cause abdominal pain. However, since the cause of your symptoms may not be clear, discuss your diet more with your healthcare provider or specialist for further recommendations.   · If you have diarrhea, it may help to avoid dairy (lactose) for the time being. A low fat, low fiber diet can also help.  · Eating several small meals per day as opposed to 2 or 3 larger meals may help.  · Avoid dehydration. Make sure to drink plenty of water. Other options include broth, soup, gelatin, sports drinks, or other clear liquids.  · Watch closely for anything that may make your  Pt arrives via POV with c/o slip and fall on ice while out walking with family. Pt is on eliquis, Pt reports left leg pain mid thigh to knee. Pt denies LOC.     Triage Assessment     Row Name 02/12/23 1032       Triage Assessment (Adult)    Airway WDL WDL       Respiratory WDL    Respiratory WDL WDL       Skin Circulation/Temperature WDL    Skin Circulation/Temperature WDL WDL       Cardiac WDL    Cardiac WDL WDL       Peripheral/Neurovascular WDL    Peripheral Neurovascular WDL WDL       Cognitive/Neuro/Behavioral WDL    Cognitive/Neuro/Behavioral WDL WDL               symptoms worse or better. Pay close attention to symptoms below that may mean your condition is getting worse.  Follow-up care  Follow up with your healthcare provider if your symptoms are not improving, or as advised. In some cases, you may need more testing.  When to seek medical advice  Call your healthcare provider right away if any of these occur:  · Pain is becoming worse  · You are unable to take your medicines or can't keep water down due to excessive vomiting  · Swelling of the abdomen  · Fever of 100.4ºF (38ºC) or higher, or as directed by your healthcare provider  · Blood in vomit or bowel movements (dark red or black color)  · Jaundice (yellow color of eyes and skin)  · New onset of weakness, dizziness or fainting  · New onset of chest, arm, back, neck or jaw pain  © 6872-5161 [employer]. [address] [place] [address], [place] [address]. All rights reserved. This information is not intended as a substitute for professional medical care. Always follow your healthcare professional's instructions.

## 2023-02-12 NOTE — ED PROVIDER NOTES
History     Chief Complaint:  Fall and Leg Pain       The history is provided by the patient.      Florence Murray is a 72 year old female, anticoagulated on Eliquis, with a history of atrial fibrillation, stroke, and hypertension who presents for evaluation of leg pain after a fall. She reports that she was hiking in a park with her family for her grandson's birthday party today, when she slipped on ice and fell to the ground. The patient denies head injury or loss of consciousness. She reports her leg flexing as she fell, noting pain from her left thigh to knee. She was unable to move after the fall and did not try to get up. Her sons were able to help her up and into the car. The patient denies other injuries. No neck pain or pain in the right leg, and she denies much pain in the left hip. The patient mentions past bilateral knee replacements done at [facility].     Independent Historian:   Spouse/Partner - They report witnessing the fall. He says the patient was on an incline, describing the patient falling softly in slow motion. He notes that she rolled a little once she got to the ground, which the patient says she thinks was to protect her head.    Review of External Notes:      ROS:  Review of Systems   Musculoskeletal: Positive for arthralgias (left knee), gait problem and myalgias. Negative for neck pain.   Neurological:        (-) loss of consciousness    All other systems reviewed and are negative.      Allergies:  Nickel  Sulfa Drugs  Ultram   Dyazide   Hydrochlorothiazide W-Triamterene    Medications:    Amlodipine  Eliquis  Atenolol  Lisinopril  Omeprazole  Atarax  Zoloft    Past Medical History:    Atrial fibrillation with RVR   Cerebral artery occlusion with cerebral infarction  Hypertension  Perforated appendicitis  TIA  Restless leg syndrome  Pancreatic lesion  Bacteremia  C difficile colitis  Anemia  Adrenal nodule   Sensorineural hearing loss  Diverticulitis  Splenic artery aneurysm  Liver  "fibrosis  Ostearthritis     Past Surgical History:    Bilateral knee replacement  Cardiology DRAKE occluder referral   Cataract extraction  Appendectomy   Colonoscopy  EGD    Family History:    Hypertension  Cataracts  Glaucoma   Kidney disease   Colon cancer     Social History:  The patient presents with her .   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  PCP: Yandle Corey     Physical Exam     Patient Vitals for the past 24 hrs:   BP Temp Temp src Pulse Resp SpO2 Height Weight   02/12/23 1639 -- -- -- -- 16 -- -- --   02/12/23 1618 (!) 150/79 96.8  F (36  C) Temporal 71 16 97 % 1.575 m (5' 2\") 92.8 kg (204 lb 9.6 oz)   02/12/23 1540 -- -- -- 81 -- 95 % -- --   02/12/23 1535 -- -- -- 83 -- -- -- --   02/12/23 1530 133/82 -- -- 79 -- -- -- --   02/12/23 1445 (!) 141/96 -- -- 75 -- -- -- --   02/12/23 1430 (!) 148/91 -- -- 89 -- -- -- --   02/12/23 1415 (!) 149/91 -- -- 79 -- 99 % -- --   02/12/23 1400 (!) 143/82 -- -- 76 -- 95 % -- --   02/12/23 1345 (!) 140/74 -- -- 77 -- 99 % -- --   02/12/23 1330 (!) 135/94 -- -- 73 -- 92 % -- --   02/12/23 1315 131/78 -- -- 92 -- 95 % -- --   02/12/23 1245 -- -- -- 67 -- 95 % -- --   02/12/23 1230 (!) 165/101 -- -- 70 -- 98 % -- --   02/12/23 1215 (!) 147/81 -- -- 69 -- 92 % -- --   02/12/23 1200 137/81 -- -- 70 -- 97 % -- --   02/12/23 1145 (!) 143/79 -- -- -- -- -- -- --   02/12/23 1115 122/70 -- -- 70 -- 97 % -- --   02/12/23 1100 (!) 141/90 -- -- 64 -- 100 % -- --   02/12/23 1047 -- -- -- -- -- -- 1.575 m (5' 2\") 86.2 kg (190 lb)   02/12/23 1045 -- -- -- 73 -- 99 % -- --   02/12/23 1040 -- -- -- 78 -- 98 % -- --   02/12/23 1035 (!) 157/86 -- -- 73 -- 99 % -- --   02/12/23 1031 (!) 156/100 97.6  F (36.4  C) Oral 72 20 100 % -- --        Physical Exam    HENT:  mmm, no rhinorrhea, atraumatic.    Eyes: periorbital tissues and sclera normal  Neck: supple, no abnormal swelling ,painless ROM   Lungs:  CTAB,  no resp distress  CV: rrr, no m/r/g, ppi  Abd: soft, nontender, nondistended, " no rebound/masses/guarding/hsm  Ext: skeletal survey neg except LLE moderate STS distal femur with tenderness to palpation, unable to Rnage at knee.  Compartment soft, distal cms intact.    Skin: warm, dry, well perfused, no rashes/bruising/lesions on exposed skin  Neuro: alert, MAEE, no gross motor or sensory deficits,   Psych: Normal mood, normal affect      Emergency Department Course     Imaging:  XR Pelvis 1/2 Views   Final Result   IMPRESSION:AP view of the pelvis shows no acute displaced fracture or dislocation. Mild degenerative changes of both hips.      Acute, displaced left distal femoral periprosthetic fracture about the left total knee replacement. Associated soft tissue swelling and joint effusion.      No proximal femoral fracture. Bones are demineralized.      NOTE: ABNORMAL REPORT      THE DICTATION ABOVE DESCRIBES AN ABNORMALITY FOR WHICH FOLLOW-UP IS NEEDED.       XR Femur Left 2 Views   Final Result   IMPRESSION:AP view of the pelvis shows no acute displaced fracture or dislocation. Mild degenerative changes of both hips.      Acute, displaced left distal femoral periprosthetic fracture about the left total knee replacement. Associated soft tissue swelling and joint effusion.      No proximal femoral fracture. Bones are demineralized.      NOTE: ABNORMAL REPORT      THE DICTATION ABOVE DESCRIBES AN ABNORMALITY FOR WHICH FOLLOW-UP IS NEEDED.       Head CT w/o contrast   Final Result   IMPRESSION:   1.  No intracranial hemorrhage.   2.  Moderate-sized area of hypodensity and associated volume loss in the posterior right temporal lobe, the right temporo-occipital region and the anterior right parietal lobe. These likely represent areas of previous ischemic insult. If clinically    indicated, areas of superimposed subacute or more recent infarct can be determined with MRI.   3.  Small chronic infarct in the right cerebellar hemisphere.   4.  Mild diffuse cerebral parenchymal volume loss. Presumed  chronic hypertensive/microvascular ischemic white matter changes.       Report per radiology      Laboratory:  Labs Ordered and Resulted from Time of ED Arrival to Time of ED Departure   BASIC METABOLIC PANEL - Abnormal       Result Value    Sodium 138      Potassium 3.9      Chloride 106      Carbon Dioxide (CO2) 23      Anion Gap 9      Urea Nitrogen 25.5 (*)     Creatinine 0.71      Calcium 8.6 (*)     Glucose 103 (*)     GFR Estimate >90     CBC WITH PLATELETS AND DIFFERENTIAL - Abnormal    WBC Count 11.2 (*)     RBC Count 4.56      Hemoglobin 12.0      Hematocrit 38.0      MCV 83      MCH 26.3 (*)     MCHC 31.6      RDW 15.7 (*)     Platelet Count 271      % Neutrophils 74      % Lymphocytes 16      % Monocytes 8      % Eosinophils 1      % Basophils 0      % Immature Granulocytes 1      NRBCs per 100 WBC 0      Absolute Neutrophils 8.3      Absolute Lymphocytes 1.8      Absolute Monocytes 0.9      Absolute Eosinophils 0.1      Absolute Basophils 0.0      Absolute Immature Granulocytes 0.1      Absolute NRBCs 0.0     TYPE AND SCREEN, ADULT    ABO/RH(D) B NEG      Antibody Screen Negative      SPECIMEN EXPIRATION DATE [id number]     ABO/RH TYPE AND SCREEN        Emergency Department Course & Assessments:       Interventions:  Medications   HYDROmorphone (PF) (DILAUDID) injection 0.5 mg (0.5 mg Intravenous Given 2/12/23 1258)   HYDROmorphone (PF) (DILAUDID) injection 0.5 mg (0.5 mg Intravenous Given 2/12/23 1057)   ondansetron (ZOFRAN) injection 4 mg (4 mg Intravenous Given 2/12/23 1153)   acetaminophen (TYLENOL) tablet 1,000 mg (1,000 mg Oral Given 2/12/23 1331)        Independent Interpretation (X-rays, CTs, rhythm strip):  L distal femoral shaft fracture     CT head with encephalomalacia R parietal/occipital lobes     Consultations/Discussion of Management or Tests:  123 I spoke with Dr. Carnes, orthopedic surgery, regarding the patient and her presentation in the emergency department today. He will come  see her in the ED.  1305 I spoke with Dr. Carnes, orthopedic surgery, regarding the plan.   1310 I spoke with Susan Longoria PA-C of the hospitalist team regarding the patient, who accepted the patient for admission for Dr. Polo.         Assessments:  1028 I obtained history and examined the patient as noted above.   1044 I rechecked the patient and started IV using ultrasound.   1232 I rechecked the patient and explained findings.    Disposition:  The patient was admitted to the hospital under the care of Dr. Polo.     Impression & Plan      Medical Decision Makin y F on DOAC with LLE after ground level fall.  No head injury.  C spine clinically cleared.  L distal femoral shaft fracture.  CMS intact.  CT head done and has old infarct but no acute findings.  Ortho aware, plan to take to OR tonight.  Admit to Medicine.        Diagnosis:    ICD-10-CM    1. Fall, initial encounter  W19.XXXA       2. Closed fracture of distal end of left femur, unspecified fracture morphology, initial encounter (H)  S72.402A Case Request: OPEN REDUCTION INTERNAL FIXATION, FRACTURE, FEMUR, DISTAL left periprosthetic with intramedullary nail.     Case Request: OPEN REDUCTION INTERNAL FIXATION, FRACTURE, FEMUR, DISTAL left periprosthetic with intramedullary nail.           Scribe Disclosure:  I, Olivia Molina, am serving as a scribe at 10:25 AM on 2023 to document services personally performed by Simone Arambula MD based on my observations and the provider's statements to me.     2023   Simone Arambula MD Walker, Jerome Richard, MD  23 1702

## 2023-02-12 NOTE — ED NOTES
"[facility]  ED Nurse Handoff Report    Florence Murray is a 72 year old female   ED Chief complaint: Fall and Leg Pain  . ED Diagnosis:   Final diagnoses:   None     Allergies:   Allergies   Allergen Reactions     Nickel Dermatitis     Sulfa Drugs Nausea and Vomiting     Ultram [Tramadol] Nausea and Vomiting     Dyazide Rash       Code Status: Full Code  Activity level - Baseline/Home:  Independent. Activity Level - Current:   Assist X 2. Lift room needed: No. Bariatric: No   Needed: No   Isolation: No. Infection: Not Applicable.     Vital Signs:   Vitals:    02/12/23 1031 02/12/23 1035 02/12/23 1040 02/12/23 1047   BP: (!) 156/100 (!) 157/86     Pulse: 72 73 78    Resp: 20      Temp: 97.6  F (36.4  C)      TempSrc: Oral      SpO2: 100% 99% 98%    Weight:    86.2 kg (190 lb)   Height:    1.575 m (5' 2\")       Cardiac Rhythm:  ,      Pain level:    Patient confused: No. Patient Falls Risk: Yes.   Elimination Status: Has voided   Patient Report - Initial Complaint: Pt arrives via POV with c/o slip and fall on ice while out walking with family. Pt is on eliquis, Pt reports left leg pain mid thigh to knee. Pt denies LOC.    Focused Assessment:   Musculoskeletal Musculoskeletal  MN     Musculoskeletal (Adult) Musculoskeletal WDL: .WDL except; joint(s)  Left Joint Tenderness: knee  Left Joint Swelling: knee  Additional Documentation: LLE Neurovascular Assessment (Group)   LLE Neurovascular Assessment Temperature LLE: warm  Sensation LLE: no tenderness; no tingling; no numbness         Tests Performed: labs, imaging. Abnormal Results:   Labs Ordered and Resulted from Time of ED Arrival to Time of ED Departure   BASIC METABOLIC PANEL - Abnormal       Result Value    Sodium 138      Potassium 3.9      Chloride 106      Carbon Dioxide (CO2) 23      Anion Gap 9      Urea Nitrogen 25.5 (*)     Creatinine 0.71      Calcium 8.6 (*)     Glucose 103 (*)     GFR Estimate >90     CBC WITH PLATELETS AND " DIFFERENTIAL - Abnormal    WBC Count 11.2 (*)     RBC Count 4.56      Hemoglobin 12.0      Hematocrit 38.0      MCV 83      MCH 26.3 (*)     MCHC 31.6      RDW 15.7 (*)     Platelet Count 271      % Neutrophils 74      % Lymphocytes 16      % Monocytes 8      % Eosinophils 1      % Basophils 0      % Immature Granulocytes 1      NRBCs per 100 WBC 0      Absolute Neutrophils 8.3      Absolute Lymphocytes 1.8      Absolute Monocytes 0.9      Absolute Eosinophils 0.1      Absolute Basophils 0.0      Absolute Immature Granulocytes 0.1      Absolute NRBCs 0.0     TYPE AND SCREEN, ADULT    ABO/RH(D) B NEG      Antibody Screen Negative      SPECIMEN EXPIRATION DATE [id number]     ABO/RH TYPE AND SCREEN     XR Pelvis 1/2 Views   Final Result   IMPRESSION:AP view of the pelvis shows no acute displaced fracture or dislocation. Mild degenerative changes of both hips.      Acute, displaced left distal femoral periprosthetic fracture about the left total knee replacement. Associated soft tissue swelling and joint effusion.      No proximal femoral fracture. Bones are demineralized.      NOTE: ABNORMAL REPORT      THE DICTATION ABOVE DESCRIBES AN ABNORMALITY FOR WHICH FOLLOW-UP IS NEEDED.       XR Femur Left 2 Views   Final Result   IMPRESSION:AP view of the pelvis shows no acute displaced fracture or dislocation. Mild degenerative changes of both hips.      Acute, displaced left distal femoral periprosthetic fracture about the left total knee replacement. Associated soft tissue swelling and joint effusion.      No proximal femoral fracture. Bones are demineralized.      NOTE: ABNORMAL REPORT      THE DICTATION ABOVE DESCRIBES AN ABNORMALITY FOR WHICH FOLLOW-UP IS NEEDED.       Head CT w/o contrast   Final Result   IMPRESSION:   1.  No intracranial hemorrhage.   2.  Moderate-sized area of hypodensity and associated volume loss in the posterior right temporal lobe, the right temporo-occipital region and the anterior right  parietal lobe. These likely represent areas of previous ischemic insult. If clinically    indicated, areas of superimposed subacute or more recent infarct can be determined with MRI.   3.  Small chronic infarct in the right cerebellar hemisphere.   4.  Mild diffuse cerebral parenchymal volume loss. Presumed chronic hypertensive/microvascular ischemic white matter changes.         .   Treatments provided: see MAR  Family Comments: mathew at bedside  OBS brochure/video discussed/provided to patient:  N/A  ED Medications:   Medications   HYDROmorphone (PF) (DILAUDID) injection 0.5 mg (has no administration in time range)   HYDROmorphone (PF) (DILAUDID) injection 0.5 mg (0.5 mg Intravenous Given 2/12/23 1057)   ondansetron (ZOFRAN) injection 4 mg (4 mg Intravenous Given 2/12/23 1153)     Drips infusing:  No  For the majority of the shift, the patient's behavior Green. Interventions performed were N/A.    Sepsis treatment initiated: No     Patient tested for COVID 19 prior to admission: NO    ED Nurse Name/Phone Number: Dianna Wood RN,   12:39 PM    RECEIVING UNIT ED HANDOFF REVIEW    Above ED Nurse Handoff Report was reviewed: Yes  Reviewed by: Rosalba Anderson RN on February 12, 2023 at 3:32 PM

## 2023-02-12 NOTE — PROVIDER NOTIFICATION
Spoke with RN in PACU who confirmed with Dr. Carnes that pt is not having surgery tonight.  Update sent to Dr. Polo-received order for pt to have regular diet tonight.

## 2023-02-12 NOTE — CONSULTS
Consult Date: 2023    CHIEF COMPLAINT:  Fall.    HISTORY OF PRESENT ILLNESS:  The patient is a 72-year-old female on Eliquis for atrial fibrillation, who has no history of DVT or diabetes.  She is status post left total knee arthroplasty by Dr. Stefano Bianchi in .  She has had no interval issues until she slipped and fell.  She describes no other complaints or problems.  Accompanied by her  today.    PHYSICAL EXAMINATION:  Exam shows her leg to be shortened and rotated.  Skin is intact.  She is able to flex and extend her toes.  She has a healed anterior incision.  There is mild soft tissue swelling.    Radiographs demonstrated displaced distal femur fracture above her total knee arthroplasty.  There is questionable slight comminution.    IMPRESSION:    1.  Left distal femur periprosthetic fracture.  2.  Status post left total knee arthroplasty.    RECOMMENDATION:  I spoke in detail with the patient and her  regarding the risks, benefits, complications, and postoperative course associated with internal fixation.  I reviewed that there is a short distal segment, but I am optimistic that nailing will be effective and provide stable enough fixation to allow for union.  I reviewed that a hinge arthroplasty would be a salvage, but given her age, I would recommend ORIF initially.  Risks and benefits of surgery were reviewed and all their questions were answered.  Surgery will be planned tomorrow.    Yony Carnes MD        D: 2023   T: 2023   MT: ANALISA    Name:     NELLY GILMORE  MRN:      [id number]        Account:      [id number]   :      1950           Consult Date: 2023     Document: [id number]

## 2023-02-13 ENCOUNTER — APPOINTMENT (OUTPATIENT)
Dept: GENERAL RADIOLOGY | Facility: CLINIC | Age: 73
DRG: 481 | End: 2023-02-13
Attending: STUDENT IN AN ORGANIZED HEALTH CARE EDUCATION/TRAINING PROGRAM
Payer: COMMERCIAL

## 2023-02-13 ENCOUNTER — ANESTHESIA (OUTPATIENT)
Dept: SURGERY | Facility: CLINIC | Age: 73
DRG: 481 | End: 2023-02-13
Payer: COMMERCIAL

## 2023-02-13 ENCOUNTER — ANESTHESIA EVENT (OUTPATIENT)
Dept: SURGERY | Facility: CLINIC | Age: 73
DRG: 481 | End: 2023-02-13
Payer: COMMERCIAL

## 2023-02-13 LAB
ATRIAL RATE - MUSE: 76 BPM
DIASTOLIC BLOOD PRESSURE - MUSE: NORMAL MMHG
GLUCOSE BLDC GLUCOMTR-MCNC: 101 MG/DL (ref 70–99)
INTERPRETATION ECG - MUSE: NORMAL
P AXIS - MUSE: NORMAL DEGREES
PR INTERVAL - MUSE: NORMAL MS
QRS DURATION - MUSE: 76 MS
QT - MUSE: 408 MS
QTC - MUSE: 452 MS
R AXIS - MUSE: 39 DEGREES
SYSTOLIC BLOOD PRESSURE - MUSE: NORMAL MMHG
T AXIS - MUSE: -41 DEGREES
VENTRICULAR RATE- MUSE: 74 BPM

## 2023-02-13 PROCEDURE — 250N000009 HC RX 250: Performed by: PHYSICIAN ASSISTANT

## 2023-02-13 PROCEDURE — 250N000011 HC RX IP 250 OP 636: Performed by: NURSE ANESTHETIST, CERTIFIED REGISTERED

## 2023-02-13 PROCEDURE — 250N000011 HC RX IP 250 OP 636: Performed by: PHYSICIAN ASSISTANT

## 2023-02-13 PROCEDURE — 99232 SBSQ HOSP IP/OBS MODERATE 35: CPT | Performed by: INTERNAL MEDICINE

## 2023-02-13 PROCEDURE — 258N000003 HC RX IP 258 OP 636: Performed by: PHYSICIAN ASSISTANT

## 2023-02-13 PROCEDURE — 258N000003 HC RX IP 258 OP 636: Performed by: NURSE ANESTHETIST, CERTIFIED REGISTERED

## 2023-02-13 PROCEDURE — C1713 ANCHOR/SCREW BN/BN,TIS/BN: HCPCS | Performed by: ORTHOPAEDIC SURGERY

## 2023-02-13 PROCEDURE — 250N000013 HC RX MED GY IP 250 OP 250 PS 637: Performed by: INTERNAL MEDICINE

## 2023-02-13 PROCEDURE — 250N000011 HC RX IP 250 OP 636: Performed by: ANESTHESIOLOGY

## 2023-02-13 PROCEDURE — 0QSC04Z REPOSITION LEFT LOWER FEMUR WITH INTERNAL FIXATION DEVICE, OPEN APPROACH: ICD-10-PCS | Performed by: ORTHOPAEDIC SURGERY

## 2023-02-13 PROCEDURE — 120N000001 HC R&B MED SURG/OB

## 2023-02-13 PROCEDURE — 999N000179 XR SURGERY CARM FLUORO LESS THAN 5 MIN W STILLS: Mod: TC

## 2023-02-13 PROCEDURE — 250N000012 HC RX MED GY IP 250 OP 636 PS 637: Performed by: INTERNAL MEDICINE

## 2023-02-13 PROCEDURE — 250N000025 HC SEVOFLURANE, PER MIN: Performed by: ORTHOPAEDIC SURGERY

## 2023-02-13 PROCEDURE — 710N000009 HC RECOVERY PHASE 1, LEVEL 1, PER MIN: Performed by: ORTHOPAEDIC SURGERY

## 2023-02-13 PROCEDURE — 250N000009 HC RX 250: Performed by: NURSE ANESTHETIST, CERTIFIED REGISTERED

## 2023-02-13 PROCEDURE — 250N000011 HC RX IP 250 OP 636: Performed by: ORTHOPAEDIC SURGERY

## 2023-02-13 PROCEDURE — 258N000003 HC RX IP 258 OP 636: Performed by: ANESTHESIOLOGY

## 2023-02-13 PROCEDURE — 258N000003 HC RX IP 258 OP 636: Performed by: ORTHOPAEDIC SURGERY

## 2023-02-13 PROCEDURE — 258N000001 HC RX 258: Performed by: ORTHOPAEDIC SURGERY

## 2023-02-13 PROCEDURE — 370N000017 HC ANESTHESIA TECHNICAL FEE, PER MIN: Performed by: ORTHOPAEDIC SURGERY

## 2023-02-13 PROCEDURE — 360N000084 HC SURGERY LEVEL 4 W/ FLUORO, PER MIN: Performed by: ORTHOPAEDIC SURGERY

## 2023-02-13 PROCEDURE — 272N000002 HC OR SUPPLY OTHER OPNP: Performed by: ORTHOPAEDIC SURGERY

## 2023-02-13 PROCEDURE — 999N000141 HC STATISTIC PRE-PROCEDURE NURSING ASSESSMENT: Performed by: ORTHOPAEDIC SURGERY

## 2023-02-13 PROCEDURE — 250N000013 HC RX MED GY IP 250 OP 250 PS 637: Performed by: ORTHOPAEDIC SURGERY

## 2023-02-13 PROCEDURE — 250N000009 HC RX 250: Performed by: ORTHOPAEDIC SURGERY

## 2023-02-13 PROCEDURE — 272N000001 HC OR GENERAL SUPPLY STERILE: Performed by: ORTHOPAEDIC SURGERY

## 2023-02-13 DEVICE — IMPLANTABLE DEVICE: Type: IMPLANTABLE DEVICE | Site: LEG | Status: FUNCTIONAL

## 2023-02-13 DEVICE — IMP SCR STRK AXSOS 3  5.0X70MM LOCK ST TI 661170: Type: IMPLANTABLE DEVICE | Site: LEG | Status: FUNCTIONAL

## 2023-02-13 RX ORDER — FENTANYL CITRATE 50 UG/ML
50 INJECTION, SOLUTION INTRAMUSCULAR; INTRAVENOUS
Status: DISCONTINUED | OUTPATIENT
Start: 2023-02-13 | End: 2023-02-14

## 2023-02-13 RX ORDER — AMLODIPINE BESYLATE 2.5 MG/1
2.5 TABLET ORAL DAILY
Status: DISCONTINUED | OUTPATIENT
Start: 2023-02-13 | End: 2023-02-16 | Stop reason: HOSPADM

## 2023-02-13 RX ORDER — BISACODYL 10 MG
10 SUPPOSITORY, RECTAL RECTAL DAILY PRN
Status: DISCONTINUED | OUTPATIENT
Start: 2023-02-13 | End: 2023-02-16 | Stop reason: HOSPADM

## 2023-02-13 RX ORDER — SODIUM CHLORIDE, SODIUM LACTATE, POTASSIUM CHLORIDE, CALCIUM CHLORIDE 600; 310; 30; 20 MG/100ML; MG/100ML; MG/100ML; MG/100ML
INJECTION, SOLUTION INTRAVENOUS CONTINUOUS
Status: DISCONTINUED | OUTPATIENT
Start: 2023-02-13 | End: 2023-02-13 | Stop reason: HOSPADM

## 2023-02-13 RX ORDER — CEFAZOLIN SODIUM/WATER 2 G/20 ML
2 SYRINGE (ML) INTRAVENOUS SEE ADMIN INSTRUCTIONS
Status: DISCONTINUED | OUTPATIENT
Start: 2023-02-13 | End: 2023-02-13 | Stop reason: HOSPADM

## 2023-02-13 RX ORDER — NALOXONE HYDROCHLORIDE 0.4 MG/ML
0.4 INJECTION, SOLUTION INTRAMUSCULAR; INTRAVENOUS; SUBCUTANEOUS
Status: DISCONTINUED | OUTPATIENT
Start: 2023-02-13 | End: 2023-02-16 | Stop reason: HOSPADM

## 2023-02-13 RX ORDER — PROCHLORPERAZINE MALEATE 5 MG
5 TABLET ORAL EVERY 6 HOURS PRN
Status: DISCONTINUED | OUTPATIENT
Start: 2023-02-13 | End: 2023-02-16 | Stop reason: HOSPADM

## 2023-02-13 RX ORDER — POLYETHYLENE GLYCOL 3350 17 G/17G
17 POWDER, FOR SOLUTION ORAL DAILY
Status: DISCONTINUED | OUTPATIENT
Start: 2023-02-14 | End: 2023-02-16 | Stop reason: HOSPADM

## 2023-02-13 RX ORDER — CEFAZOLIN SODIUM 2 G/100ML
2 INJECTION, SOLUTION INTRAVENOUS EVERY 8 HOURS
Status: COMPLETED | OUTPATIENT
Start: 2023-02-13 | End: 2023-02-14

## 2023-02-13 RX ORDER — ACETAMINOPHEN 325 MG/1
975 TABLET ORAL ONCE
Status: DISCONTINUED | OUTPATIENT
Start: 2023-02-13 | End: 2023-02-13 | Stop reason: HOSPADM

## 2023-02-13 RX ORDER — FENTANYL CITRATE 50 UG/ML
INJECTION, SOLUTION INTRAMUSCULAR; INTRAVENOUS PRN
Status: DISCONTINUED | OUTPATIENT
Start: 2023-02-13 | End: 2023-02-13

## 2023-02-13 RX ORDER — HYDROMORPHONE HCL IN WATER/PF 6 MG/30 ML
0.2 PATIENT CONTROLLED ANALGESIA SYRINGE INTRAVENOUS EVERY 5 MIN PRN
Status: DISCONTINUED | OUTPATIENT
Start: 2023-02-13 | End: 2023-02-13 | Stop reason: HOSPADM

## 2023-02-13 RX ORDER — OXYCODONE HYDROCHLORIDE 10 MG/1
10 TABLET ORAL EVERY 4 HOURS PRN
Status: DISCONTINUED | OUTPATIENT
Start: 2023-02-13 | End: 2023-02-16 | Stop reason: HOSPADM

## 2023-02-13 RX ORDER — PREDNISONE 20 MG/1
20 TABLET ORAL DAILY
Status: DISCONTINUED | OUTPATIENT
Start: 2023-02-14 | End: 2023-02-16 | Stop reason: HOSPADM

## 2023-02-13 RX ORDER — HYDROXYZINE HYDROCHLORIDE 10 MG/1
10 TABLET, FILM COATED ORAL EVERY 6 HOURS PRN
Status: DISCONTINUED | OUTPATIENT
Start: 2023-02-13 | End: 2023-02-16 | Stop reason: HOSPADM

## 2023-02-13 RX ORDER — ACETAMINOPHEN 325 MG/1
650 TABLET ORAL EVERY 4 HOURS PRN
Status: DISCONTINUED | OUTPATIENT
Start: 2023-02-16 | End: 2023-02-16 | Stop reason: HOSPADM

## 2023-02-13 RX ORDER — NALOXONE HYDROCHLORIDE 0.4 MG/ML
0.2 INJECTION, SOLUTION INTRAMUSCULAR; INTRAVENOUS; SUBCUTANEOUS
Status: DISCONTINUED | OUTPATIENT
Start: 2023-02-13 | End: 2023-02-16 | Stop reason: HOSPADM

## 2023-02-13 RX ORDER — OXYCODONE HYDROCHLORIDE 5 MG/1
5 TABLET ORAL EVERY 4 HOURS PRN
Status: DISCONTINUED | OUTPATIENT
Start: 2023-02-13 | End: 2023-02-16 | Stop reason: HOSPADM

## 2023-02-13 RX ORDER — TRANEXAMIC ACID 10 MG/ML
1 INJECTION, SOLUTION INTRAVENOUS ONCE
Status: COMPLETED | OUTPATIENT
Start: 2023-02-13 | End: 2023-02-13

## 2023-02-13 RX ORDER — ONDANSETRON 2 MG/ML
4 INJECTION INTRAMUSCULAR; INTRAVENOUS EVERY 30 MIN PRN
Status: DISCONTINUED | OUTPATIENT
Start: 2023-02-13 | End: 2023-02-13 | Stop reason: HOSPADM

## 2023-02-13 RX ORDER — FENTANYL CITRATE 50 UG/ML
25 INJECTION, SOLUTION INTRAMUSCULAR; INTRAVENOUS EVERY 5 MIN PRN
Status: DISCONTINUED | OUTPATIENT
Start: 2023-02-13 | End: 2023-02-13 | Stop reason: HOSPADM

## 2023-02-13 RX ORDER — CEFAZOLIN SODIUM/WATER 2 G/20 ML
2 SYRINGE (ML) INTRAVENOUS
Status: COMPLETED | OUTPATIENT
Start: 2023-02-13 | End: 2023-02-13

## 2023-02-13 RX ORDER — LISINOPRIL 40 MG/1
40 TABLET ORAL DAILY
Status: DISCONTINUED | OUTPATIENT
Start: 2023-02-14 | End: 2023-02-16 | Stop reason: HOSPADM

## 2023-02-13 RX ORDER — ACETAMINOPHEN 325 MG/1
975 TABLET ORAL EVERY 8 HOURS
Status: COMPLETED | OUTPATIENT
Start: 2023-02-13 | End: 2023-02-16

## 2023-02-13 RX ORDER — OXYCODONE HYDROCHLORIDE 5 MG/1
5 TABLET ORAL EVERY 4 HOURS PRN
Status: DISCONTINUED | OUTPATIENT
Start: 2023-02-13 | End: 2023-02-13 | Stop reason: HOSPADM

## 2023-02-13 RX ORDER — HYDROMORPHONE HCL IN WATER/PF 6 MG/30 ML
0.2 PATIENT CONTROLLED ANALGESIA SYRINGE INTRAVENOUS
Status: DISCONTINUED | OUTPATIENT
Start: 2023-02-13 | End: 2023-02-16 | Stop reason: HOSPADM

## 2023-02-13 RX ORDER — HALOPERIDOL 5 MG/ML
2 INJECTION INTRAMUSCULAR EVERY 6 HOURS PRN
Status: DISCONTINUED | OUTPATIENT
Start: 2023-02-13 | End: 2023-02-13

## 2023-02-13 RX ORDER — AMOXICILLIN 250 MG
1 CAPSULE ORAL 2 TIMES DAILY
Status: DISCONTINUED | OUTPATIENT
Start: 2023-02-13 | End: 2023-02-16 | Stop reason: HOSPADM

## 2023-02-13 RX ORDER — ONDANSETRON 2 MG/ML
4 INJECTION INTRAMUSCULAR; INTRAVENOUS EVERY 6 HOURS PRN
Status: DISCONTINUED | OUTPATIENT
Start: 2023-02-13 | End: 2023-02-16 | Stop reason: HOSPADM

## 2023-02-13 RX ORDER — DEXAMETHASONE SODIUM PHOSPHATE 10 MG/ML
INJECTION, SOLUTION INTRAMUSCULAR; INTRAVENOUS PRN
Status: DISCONTINUED | OUTPATIENT
Start: 2023-02-13 | End: 2023-02-13

## 2023-02-13 RX ORDER — PREDNISONE 20 MG/1
20 TABLET ORAL ONCE
Status: COMPLETED | OUTPATIENT
Start: 2023-02-13 | End: 2023-02-13

## 2023-02-13 RX ORDER — FENTANYL CITRATE 50 UG/ML
50 INJECTION, SOLUTION INTRAMUSCULAR; INTRAVENOUS EVERY 5 MIN PRN
Status: DISCONTINUED | OUTPATIENT
Start: 2023-02-13 | End: 2023-02-13 | Stop reason: HOSPADM

## 2023-02-13 RX ORDER — PROPOFOL 10 MG/ML
INJECTION, EMULSION INTRAVENOUS PRN
Status: DISCONTINUED | OUTPATIENT
Start: 2023-02-13 | End: 2023-02-13

## 2023-02-13 RX ORDER — ONDANSETRON 4 MG/1
4 TABLET, ORALLY DISINTEGRATING ORAL EVERY 6 HOURS PRN
Status: DISCONTINUED | OUTPATIENT
Start: 2023-02-13 | End: 2023-02-16 | Stop reason: HOSPADM

## 2023-02-13 RX ORDER — HYDROMORPHONE HCL IN WATER/PF 6 MG/30 ML
0.4 PATIENT CONTROLLED ANALGESIA SYRINGE INTRAVENOUS
Status: DISCONTINUED | OUTPATIENT
Start: 2023-02-13 | End: 2023-02-16 | Stop reason: HOSPADM

## 2023-02-13 RX ORDER — ONDANSETRON 2 MG/ML
INJECTION INTRAMUSCULAR; INTRAVENOUS PRN
Status: DISCONTINUED | OUTPATIENT
Start: 2023-02-13 | End: 2023-02-13

## 2023-02-13 RX ORDER — ONDANSETRON 4 MG/1
4 TABLET, ORALLY DISINTEGRATING ORAL EVERY 30 MIN PRN
Status: DISCONTINUED | OUTPATIENT
Start: 2023-02-13 | End: 2023-02-13 | Stop reason: HOSPADM

## 2023-02-13 RX ORDER — HYDROMORPHONE HCL IN WATER/PF 6 MG/30 ML
0.4 PATIENT CONTROLLED ANALGESIA SYRINGE INTRAVENOUS EVERY 5 MIN PRN
Status: DISCONTINUED | OUTPATIENT
Start: 2023-02-13 | End: 2023-02-13 | Stop reason: HOSPADM

## 2023-02-13 RX ORDER — PROPOFOL 10 MG/ML
INJECTION, EMULSION INTRAVENOUS CONTINUOUS PRN
Status: DISCONTINUED | OUTPATIENT
Start: 2023-02-13 | End: 2023-02-13

## 2023-02-13 RX ORDER — HYDRALAZINE HYDROCHLORIDE 20 MG/ML
2.5-5 INJECTION INTRAMUSCULAR; INTRAVENOUS EVERY 10 MIN PRN
Status: DISCONTINUED | OUTPATIENT
Start: 2023-02-13 | End: 2023-02-13 | Stop reason: HOSPADM

## 2023-02-13 RX ORDER — LIDOCAINE 40 MG/G
CREAM TOPICAL
Status: DISCONTINUED | OUTPATIENT
Start: 2023-02-13 | End: 2023-02-16 | Stop reason: HOSPADM

## 2023-02-13 RX ORDER — GLYCOPYRROLATE 0.2 MG/ML
INJECTION, SOLUTION INTRAMUSCULAR; INTRAVENOUS PRN
Status: DISCONTINUED | OUTPATIENT
Start: 2023-02-13 | End: 2023-02-13

## 2023-02-13 RX ORDER — METOPROLOL TARTRATE 1 MG/ML
1-2 INJECTION, SOLUTION INTRAVENOUS EVERY 5 MIN PRN
Status: DISCONTINUED | OUTPATIENT
Start: 2023-02-13 | End: 2023-02-13 | Stop reason: HOSPADM

## 2023-02-13 RX ORDER — LIDOCAINE HYDROCHLORIDE 10 MG/ML
INJECTION, SOLUTION INFILTRATION; PERINEURAL PRN
Status: DISCONTINUED | OUTPATIENT
Start: 2023-02-13 | End: 2023-02-13

## 2023-02-13 RX ORDER — OXYCODONE HYDROCHLORIDE 5 MG/1
10 TABLET ORAL EVERY 4 HOURS PRN
Status: DISCONTINUED | OUTPATIENT
Start: 2023-02-13 | End: 2023-02-13 | Stop reason: HOSPADM

## 2023-02-13 RX ORDER — OXYCODONE HYDROCHLORIDE 5 MG/1
5 TABLET ORAL EVERY 4 HOURS PRN
Status: DISCONTINUED | OUTPATIENT
Start: 2023-02-13 | End: 2023-02-14

## 2023-02-13 RX ORDER — OXYCODONE HYDROCHLORIDE 5 MG/1
10 TABLET ORAL EVERY 4 HOURS PRN
Status: DISCONTINUED | OUTPATIENT
Start: 2023-02-13 | End: 2023-02-14

## 2023-02-13 RX ORDER — DIPHENHYDRAMINE HCL 25 MG
25 TABLET ORAL
Status: DISCONTINUED | OUTPATIENT
Start: 2023-02-13 | End: 2023-02-13

## 2023-02-13 RX ORDER — DIPHENHYDRAMINE HYDROCHLORIDE 50 MG/ML
25 INJECTION INTRAMUSCULAR; INTRAVENOUS EVERY 6 HOURS PRN
Status: DISCONTINUED | OUTPATIENT
Start: 2023-02-13 | End: 2023-02-16 | Stop reason: HOSPADM

## 2023-02-13 RX ORDER — NEOSTIGMINE METHYLSULFATE 1 MG/ML
VIAL (ML) INJECTION PRN
Status: DISCONTINUED | OUTPATIENT
Start: 2023-02-13 | End: 2023-02-13

## 2023-02-13 RX ORDER — SODIUM CHLORIDE, SODIUM LACTATE, POTASSIUM CHLORIDE, CALCIUM CHLORIDE 600; 310; 30; 20 MG/100ML; MG/100ML; MG/100ML; MG/100ML
INJECTION, SOLUTION INTRAVENOUS CONTINUOUS
Status: DISCONTINUED | OUTPATIENT
Start: 2023-02-13 | End: 2023-02-16 | Stop reason: HOSPADM

## 2023-02-13 RX ORDER — DIPHENHYDRAMINE HCL 25 MG
25 CAPSULE ORAL EVERY 6 HOURS PRN
Status: DISCONTINUED | OUTPATIENT
Start: 2023-02-13 | End: 2023-02-16 | Stop reason: HOSPADM

## 2023-02-13 RX ORDER — LABETALOL 20 MG/4 ML (5 MG/ML) INTRAVENOUS SYRINGE
PRN
Status: DISCONTINUED | OUTPATIENT
Start: 2023-02-13 | End: 2023-02-13

## 2023-02-13 RX ORDER — LIDOCAINE 40 MG/G
CREAM TOPICAL
Status: DISCONTINUED | OUTPATIENT
Start: 2023-02-13 | End: 2023-02-13 | Stop reason: HOSPADM

## 2023-02-13 RX ADMIN — ROCURONIUM BROMIDE 20 MG: 50 INJECTION, SOLUTION INTRAVENOUS at 14:55

## 2023-02-13 RX ADMIN — LABETALOL 20 MG/4 ML (5 MG/ML) INTRAVENOUS SYRINGE 5 MG: at 16:39

## 2023-02-13 RX ADMIN — HYDROMORPHONE HYDROCHLORIDE 0.4 MG: 0.2 INJECTION, SOLUTION INTRAMUSCULAR; INTRAVENOUS; SUBCUTANEOUS at 08:02

## 2023-02-13 RX ADMIN — PHENYLEPHRINE HYDROCHLORIDE 100 MCG: 10 INJECTION INTRAVENOUS at 14:58

## 2023-02-13 RX ADMIN — PHENYLEPHRINE HYDROCHLORIDE 200 MCG: 10 INJECTION INTRAVENOUS at 15:32

## 2023-02-13 RX ADMIN — DEXAMETHASONE SODIUM PHOSPHATE 4 MG: 10 INJECTION, SOLUTION INTRAMUSCULAR; INTRAVENOUS at 14:55

## 2023-02-13 RX ADMIN — SERTRALINE HYDROCHLORIDE 25 MG: 25 TABLET ORAL at 08:01

## 2023-02-13 RX ADMIN — HYDROMORPHONE HYDROCHLORIDE 0.2 MG: 0.2 INJECTION, SOLUTION INTRAMUSCULAR; INTRAVENOUS; SUBCUTANEOUS at 13:16

## 2023-02-13 RX ADMIN — SODIUM CHLORIDE, POTASSIUM CHLORIDE, SODIUM LACTATE AND CALCIUM CHLORIDE: 600; 310; 30; 20 INJECTION, SOLUTION INTRAVENOUS at 19:02

## 2023-02-13 RX ADMIN — CEFAZOLIN SODIUM 2 G: 2 INJECTION, SOLUTION INTRAVENOUS at 22:45

## 2023-02-13 RX ADMIN — FENTANYL CITRATE 50 MCG: 50 INJECTION INTRAMUSCULAR; INTRAVENOUS at 18:39

## 2023-02-13 RX ADMIN — ROCURONIUM BROMIDE 10 MG: 50 INJECTION, SOLUTION INTRAVENOUS at 15:51

## 2023-02-13 RX ADMIN — PHENYLEPHRINE HYDROCHLORIDE 100 MCG: 10 INJECTION INTRAVENOUS at 15:51

## 2023-02-13 RX ADMIN — HYDROMORPHONE HYDROCHLORIDE 0.4 MG: 0.2 INJECTION, SOLUTION INTRAMUSCULAR; INTRAVENOUS; SUBCUTANEOUS at 10:29

## 2023-02-13 RX ADMIN — PROPOFOL 150 MG: 10 INJECTION, EMULSION INTRAVENOUS at 14:55

## 2023-02-13 RX ADMIN — OXYCODONE HYDROCHLORIDE 5 MG: 5 TABLET ORAL at 19:51

## 2023-02-13 RX ADMIN — SODIUM CHLORIDE, POTASSIUM CHLORIDE, SODIUM LACTATE AND CALCIUM CHLORIDE: 600; 310; 30; 20 INJECTION, SOLUTION INTRAVENOUS at 15:51

## 2023-02-13 RX ADMIN — ACETAMINOPHEN 975 MG: 325 TABLET ORAL at 19:51

## 2023-02-13 RX ADMIN — PHENYLEPHRINE HYDROCHLORIDE 200 MCG: 10 INJECTION INTRAVENOUS at 15:18

## 2023-02-13 RX ADMIN — ATENOLOL 100 MG: 50 TABLET ORAL at 22:43

## 2023-02-13 RX ADMIN — SENNOSIDES AND DOCUSATE SODIUM 1 TABLET: 50; 8.6 TABLET ORAL at 19:52

## 2023-02-13 RX ADMIN — FENTANYL CITRATE 50 MCG: 50 INJECTION INTRAMUSCULAR; INTRAVENOUS at 18:44

## 2023-02-13 RX ADMIN — TRANEXAMIC ACID 1 G: 10 INJECTION, SOLUTION INTRAVENOUS at 15:00

## 2023-02-13 RX ADMIN — PHENYLEPHRINE HYDROCHLORIDE 150 MCG: 10 INJECTION INTRAVENOUS at 17:35

## 2023-02-13 RX ADMIN — LABETALOL 20 MG/4 ML (5 MG/ML) INTRAVENOUS SYRINGE 5 MG: at 17:07

## 2023-02-13 RX ADMIN — ONDANSETRON 4 MG: 2 INJECTION INTRAMUSCULAR; INTRAVENOUS at 10:41

## 2023-02-13 RX ADMIN — HYDROMORPHONE HYDROCHLORIDE 0.4 MG: 0.2 INJECTION, SOLUTION INTRAMUSCULAR; INTRAVENOUS; SUBCUTANEOUS at 04:39

## 2023-02-13 RX ADMIN — PROPOFOL 50 MCG/KG/MIN: 10 INJECTION, EMULSION INTRAVENOUS at 15:00

## 2023-02-13 RX ADMIN — TRANEXAMIC ACID 1 G: 10 INJECTION, SOLUTION INTRAVENOUS at 17:01

## 2023-02-13 RX ADMIN — PHENYLEPHRINE HYDROCHLORIDE 100 MCG: 10 INJECTION INTRAVENOUS at 15:03

## 2023-02-13 RX ADMIN — FENTANYL CITRATE 100 MCG: 50 INJECTION, SOLUTION INTRAMUSCULAR; INTRAVENOUS at 14:55

## 2023-02-13 RX ADMIN — PHENYLEPHRINE HYDROCHLORIDE 100 MCG: 10 INJECTION INTRAVENOUS at 15:16

## 2023-02-13 RX ADMIN — LABETALOL 20 MG/4 ML (5 MG/ML) INTRAVENOUS SYRINGE 10 MG: at 16:53

## 2023-02-13 RX ADMIN — HYDROXYZINE HYDROCHLORIDE 25 MG: 25 TABLET ORAL at 22:42

## 2023-02-13 RX ADMIN — ONDANSETRON 4 MG: 2 INJECTION INTRAMUSCULAR; INTRAVENOUS at 17:03

## 2023-02-13 RX ADMIN — FENTANYL CITRATE 50 MCG: 50 INJECTION, SOLUTION INTRAMUSCULAR; INTRAVENOUS at 15:43

## 2023-02-13 RX ADMIN — PHENYLEPHRINE HYDROCHLORIDE 100 MCG: 10 INJECTION INTRAVENOUS at 17:26

## 2023-02-13 RX ADMIN — HYDROMORPHONE HYDROCHLORIDE 0.4 MG: 0.2 INJECTION, SOLUTION INTRAMUSCULAR; INTRAVENOUS; SUBCUTANEOUS at 00:18

## 2023-02-13 RX ADMIN — SODIUM CHLORIDE, POTASSIUM CHLORIDE, SODIUM LACTATE AND CALCIUM CHLORIDE: 600; 310; 30; 20 INJECTION, SOLUTION INTRAVENOUS at 14:47

## 2023-02-13 RX ADMIN — LIDOCAINE HYDROCHLORIDE 50 MG: 10 INJECTION, SOLUTION INFILTRATION; PERINEURAL at 14:55

## 2023-02-13 RX ADMIN — PHENYLEPHRINE HYDROCHLORIDE 100 MCG: 10 INJECTION INTRAVENOUS at 15:10

## 2023-02-13 RX ADMIN — SODIUM CHLORIDE, POTASSIUM CHLORIDE, SODIUM LACTATE AND CALCIUM CHLORIDE: 600; 310; 30; 20 INJECTION, SOLUTION INTRAVENOUS at 19:36

## 2023-02-13 RX ADMIN — PREDNISONE 20 MG: 20 TABLET ORAL at 05:26

## 2023-02-13 RX ADMIN — Medication 2 G: at 14:47

## 2023-02-13 RX ADMIN — HYDROMORPHONE HYDROCHLORIDE 0.4 MG: 0.2 INJECTION, SOLUTION INTRAMUSCULAR; INTRAVENOUS; SUBCUTANEOUS at 18:53

## 2023-02-13 RX ADMIN — NEOSTIGMINE METHYLSULFATE 4.5 MG: 1 INJECTION, SOLUTION INTRAVENOUS at 17:03

## 2023-02-13 RX ADMIN — SODIUM CHLORIDE: 9 INJECTION, SOLUTION INTRAVENOUS at 00:19

## 2023-02-13 RX ADMIN — FENTANYL CITRATE 50 MCG: 50 INJECTION, SOLUTION INTRAMUSCULAR; INTRAVENOUS at 16:21

## 2023-02-13 RX ADMIN — GLYCOPYRROLATE 0.8 MG: 0.2 INJECTION, SOLUTION INTRAMUSCULAR; INTRAVENOUS at 17:03

## 2023-02-13 ASSESSMENT — ACTIVITIES OF DAILY LIVING (ADL)
ADLS_ACUITY_SCORE: 30
ADLS_ACUITY_SCORE: 32
ADLS_ACUITY_SCORE: 30
ADLS_ACUITY_SCORE: 32
DEPENDENT_IADLS:: INDEPENDENT
ADLS_ACUITY_SCORE: 30
ADLS_ACUITY_SCORE: 28
ADLS_ACUITY_SCORE: 32
ADLS_ACUITY_SCORE: 28
ADLS_ACUITY_SCORE: 30
ADLS_ACUITY_SCORE: 28

## 2023-02-13 ASSESSMENT — ENCOUNTER SYMPTOMS: DYSRHYTHMIAS: 1

## 2023-02-13 NOTE — PLAN OF CARE
Goal Outcome Evaluation:  Vital signs stable,preop teaching done.  Lungs clear, encouraged inspirometer use,  Bowel sounds hypoactive, bm this morning.  CMS intact, mild swelling to left outer thigh, ice pack applied to same.  Pain controlled with iv dilaudid, tylenol, atarax and oxycodone.Zofran given for nausea with relief.  Chlorhexidine bath and linen change done.   Purewick in place, voiding.  Npo after midnight.      Plan of Care Reviewed With: patient, spouse

## 2023-02-13 NOTE — PROVIDER NOTIFICATION
Pt complaining of itching due to eczema , been taking prednisone was held because of surgery.  Asking if she can get something else to use.

## 2023-02-13 NOTE — PROVIDER NOTIFICATION
17.54 pm :Web paged SARMAD Longoria , for pt home meds to be addressed.  20.00 : Web Paged hospitalist  To address pt's home meds.

## 2023-02-13 NOTE — PHARMACY-ADMISSION MEDICATION HISTORY
Admission medication history interview status for this patient is complete. See [facility] admission navigator for allergy information, prior to admission medications and immunization status.     Medication history interview done, indicate source(s): Patient  Medication history resources (including written lists, pill bottles, clinic record):Jean, chart review  Pharmacy: [facility] [id number] [id number] KEI [address]LEEN    Changes made to PTA medication list:  Added: Benadryl, hydroxyzine, prednisone taper  Changed: none  Reported as Not Taking: none  Removed: none    Actions taken by pharmacist (provider contacted, etc):paged hospitalist     Additional medication history information:patient is currently taking 20 mg (4 x 5 mg) prednisone daily (last dose 2/11/23). Patient is supposed to take prednisone 15 mg starting on Wednesday 2/15.    Medication reconciliation/reorder completed by provider prior to medication history?  N   (Y/N)       Medication Affordability:  Not including over the counter (OTC) medications, was there a time in the past 12 months when you did not take your medications as prescribed because of cost?: No     Prior to Admission medications    Medication Sig Last Dose Taking? Auth Provider Long Term End Date   amLODIPine (NORVASC) 2.5 MG tablet Take 2.5 mg by mouth daily 2/11/2023 at HS Yes Reported, Patient Yes    apixaban ANTICOAGULANT (ELIQUIS) 5 MG tablet Take 5 mg by mouth 2 times daily 2/12/2023 at x1 Yes Reported, Patient     atenolol (TENORMIN) 100 MG tablet Take 100 mg by mouth daily 2/11/2023 at HS Yes Reported, Patient Yes    diphenhydrAMINE (BENADRYL) 25 MG tablet Take 25 mg by mouth nightly as needed for itching or allergies 2/11/2023 Yes Unknown, Entered By History     hydrOXYzine (ATARAX) 25 MG tablet Take 25-50 mg by mouth At Bedtime 2/11/2023 Yes Unknown, Entered By History     lisinopril (ZESTRIL) 40 MG tablet Take 40 mg by mouth daily 2/12/2023 Yes Reported, Patient  Yes    predniSONE (DELTASONE) 5 MG tablet TAKE 5 TABS BY MOUTH DAILY X 5 DAYS, 4 TABS X 5 DAYS, 3 TABS X 5 DAYS, 2 TABS X 5DAYS, 1 TAB X 5DAYS 2/11/2023 at 20 mg Yes Unknown, Entered By History     sertraline (ZOLOFT) 25 MG tablet Take 25 mg by mouth every morning 2/12/2023 Yes Reported, Patient Yes

## 2023-02-13 NOTE — CONSULTS
Care Management Initial Consult    General Information  Assessment completed with: Florence Lynch  Type of CM/SW Visit: Initial Assessment    Primary Care Provider verified and updated as needed: Yes (Karley LEHMAN)   Readmission within the last 30 days: no previous admission in last 30 days      Reason for Consult: care coordination/care conference, discharge planning         Communication Assessment  Patient's communication style: spoken language (English or Bilingual)    Hearing Difficulty or Deaf: no   Wear Glasses or Blind: yes    Cognitive  Cognitive/Neuro/Behavioral: WDL                      Living Environment:   People in home: spouse     Current living Arrangements: apartment      Able to return to prior arrangements: other (see comments)  Living Arrangement Comments: with HC    Family/Social Support:  Care provided by: self  Provides care for: no one  Marital Status:   , Children          Description of Support System: Supportive, Involved    Support Assessment: Adequate family and caregiver support, Adequate social supports    Current Resources:   Patient receiving home care services: No     Community Resources: None  Equipment currently used at home: none  Supplies currently used at home: None      Lifestyle & Psychosocial Needs:  Social Determinants of Health     Tobacco Use: Not on file   Alcohol Use: Not on file   Financial Resource Strain: Not on file   Food Insecurity: Not on file   Transportation Needs: Not on file   Physical Activity: Not on file   Stress: Not on file   Social Connections: Not on file   Intimate Partner Violence: Not on file   Depression: Not on file   Housing Stability: Not on file       Functional Status:  Prior to admission patient needed assistance:   Dependent ADLs:: Independent  Dependent IADLs:: Independent  Assesssment of Functional Status: Not at baseline with mobility, Not at baseline with ADL Functioning                Additional Information:  Patient admitted  with L femur fracture.  Plan for OR today.  Patient being treated with bedrest.  Patient is being followed by ortho, PT, OT and the Hospitalist.  Met with patient at the bedside.Verifed address and patient is an [facility] PCP. Prior to admission, patient was independent in all ADL's and IADL's.  She ambulated without the use of any assistive devices.  She did not use any services . Her  will provide transportation.  She would like Home Care PT at discharge. Patient has a second home in the [facility] at   [address] [place] [address]    Case Management will continue to follow for discharge planning.    Anne Marie Garrison, RN, BSN, CM  Inpatient Care Coordination  [facility]  [phone]

## 2023-02-13 NOTE — ANESTHESIA PREPROCEDURE EVALUATION
Anesthesia Pre-Procedure Evaluation    Patient: Florence Murray   MRN: [id number] : 1950        Procedure : Procedure(s):  OPEN REDUCTION INTERNAL FIXATION, FRACTURE, FEMUR, DISTAL left periprosthetic with intramedullary nail.          Past Medical History:   Diagnosis Date     Antiplatelet or antithrombotic long-term use      Arrhythmia      Cerebral artery occlusion with cerebral infarction (H)     2021     Hypertension      Irregular heart beat       Past Surgical History:   Procedure Laterality Date     COLONOSCOPY N/A 2022    Procedure: WITH COLONOSCOPY WITH POLYPECTOMY.;  Surgeon: Yvette Stokes MD;  Location: [facility]     ESOPHAGOSCOPY, GASTROSCOPY, DUODENOSCOPY (EGD), COMBINED N/A 2022    Procedure: ESOPHAGOGASTRODUODENOSCOPY WITH BIOPSIES.;  Surgeon: Yvette Stokes MD;  Location: [facility] OR     ORTHOPEDIC SURGERY       Z CARDIOLOGY DRAKE OCCLUDER REFERRAL        Allergies   Allergen Reactions     Nickel Dermatitis     Sulfa Drugs Nausea and Vomiting     Ultram [Tramadol] Nausea and Vomiting     Dyazide Rash      Social History     Tobacco Use     Smoking status: Never     Smokeless tobacco: Never   Substance Use Topics     Alcohol use: Not Currently      Wt Readings from Last 1 Encounters:   23 92.8 kg (204 lb 9.6 oz)        Anesthesia Evaluation   Pt has had prior anesthetic.         ROS/MED HX  ENT/Pulmonary:    (-) sleep apnea   Neurologic:     (+) CVA,     Cardiovascular:     (+) Dyslipidemia hypertension-----Taking blood thinners dysrhythmias, a-fib,     METS/Exercise Tolerance:     Hematologic:       Musculoskeletal:   (+) fracture, Fracture location: LLE,     GI/Hepatic:    (-) GERD   Renal/Genitourinary:       Endo:       Psychiatric/Substance Use:       Infectious Disease:       Malignancy:       Other:            Physical Exam    Airway        Mallampati: II   TM distance: > 3 FB   Neck ROM: full     Respiratory Devices and Support          Dental           Cardiovascular          Rhythm and rate: irregular and normal     Pulmonary           breath sounds clear to auscultation           OUTSIDE LABS:  CBC:   Lab Results   Component Value Date    WBC 11.2 (H) 02/12/2023    HGB 12.0 02/12/2023    HCT 38.0 02/12/2023     02/12/2023     BMP:   Lab Results   Component Value Date     02/12/2023    POTASSIUM 3.9 02/12/2023    CHLORIDE 106 02/12/2023    CO2 23 02/12/2023    BUN 25.5 (H) 02/12/2023    CR 0.71 02/12/2023     (H) 02/13/2023     (H) 02/12/2023     COAGS:   Lab Results   Component Value Date    INR 1.1 01/28/2022     POC: No results found for: BGM, HCG, HCGS  HEPATIC: No results found for: ALBUMIN, PROTTOTAL, ALT, AST, GGT, ALKPHOS, BILITOTAL, BILIDIRECT, REYMUNDO  OTHER:   Lab Results   Component Value Date    ARTHUR 8.6 (L) 02/12/2023    MAG 1.8 02/12/2023       Anesthesia Plan    ASA Status:  3   NPO Status:  NPO Appropriate    Anesthesia Type: General.     - Airway: ETT   Induction: Intravenous.   Maintenance: Balanced.        Consents    Anesthesia Plan(s) and associated risks, benefits, and realistic alternatives discussed. Questions answered and patient/representative(s) expressed understanding.    - Discussed:     - Discussed with:  Patient         Postoperative Care    Pain management: IV analgesics, Oral pain medications, Multi-modal analgesia.   PONV prophylaxis: Ondansetron (or other 5HT-3), Dexamethasone or Solumedrol     Comments:                Shilo Ribeiro MD

## 2023-02-13 NOTE — PLAN OF CARE
Pt. A/O x4. VSS. Pain 8/10 in left knee, IV dilaudid given. Scheduled surgery today, pt brought down to OR at 1340. CHG bath and wipes done. Lisinopril and Norvasc held this morning per MD order, will resume tomorrow. NPO since midnight. Pt on bedrest. Purewick in place.  at 1000. NS running at 100ml/hr. Will continue to provide supportive cares.

## 2023-02-13 NOTE — PLAN OF CARE
Goal Outcome Evaluation:  .Patient vital signs are at baseline: Yes  Patient able to ambulate as they were prior to admission or with assist devices provided by therapies during their stay:  No,  Reason:  on bedrest  Patient MUST void prior to discharge:  Yes through purewick  Patient able to tolerate oral intake:  No,  Reason:  NPO in readiness for surgery   Pain has adequate pain control using Oral analgesics:  Yes  Does patient have an identified :  Yes  Has goal D/C date and time been discussed with patient:  No,  Reason:  to be determined after surgery

## 2023-02-13 NOTE — ANESTHESIA PROCEDURE NOTES
Airway       Patient location during procedure: OR       Procedure Start/Stop Times: 2/13/2023 2:56 PM  Staff -        CRNA: Ema Burrell APRN CRNA       Performed By: CRNA  Consent for Airway        Urgency: elective  Indications and Patient Condition       Indications for airway management: valentin-procedural       Induction type:intravenous       Mask difficulty assessment: 1 - vent by mask    Final Airway Details       Final airway type: endotracheal airway       Successful airway: ETT - single  Endotracheal Airway Details        ETT size (mm): 7.0       Cuffed: yes       Successful intubation technique: direct laryngoscopy       DL Blade Type: Glynn 2       Grade View of Cords: 1       Adjucts: stylet       Position: Right       Measured from: gums/teeth       Secured at (cm): 21       Bite block used: None    Post intubation assessment        Placement verified by: capnometry, equal breath sounds and chest rise        Number of attempts at approach: 1       Number of other approaches attempted: 0       Secured with: plastic tape       Ease of procedure: easy       Dentition: Intact and Unchanged    Medication(s) Administered   Medication Administration Time: 2/13/2023 2:56 PM

## 2023-02-13 NOTE — UTILIZATION REVIEW
"  Admission Status; Secondary Review Determination         Under the authority of the Utilization Management Committee, the utilization review process indicated a secondary review on the above patient.  The review outcome is based on review of the medical records, discussions with staff, and applying clinical experience noted on the date of the review.        (xxx)      Inpatient Status Appropriate - This patient's medical care is consistent with medical management for inpatient care and reasonable inpatient medical practice.      () Observation Status Appropriate - This patient does not meet hospital inpatient criteria and is placed in observation status. If this patient's primary payer is Medicare and was admitted as an inpatient, Condition Code 44 should be used and patient status changed to \"observation\".   () Admission Status NOT Appropriate - This patient's medical care is not consistent with medical management for Inpatient or Observation Status.          RATIONALE FOR DETERMINATION     Florence Murray is a 72 year old female with a history of HTN, RLS, CVA, Afib, left TKA 2014 who was admitted with LLE pain after a fall on the ice.  Imaging showed a displaced distal femur fracture above her TKA with possible slight comminution.  Plan is for operative repair later today.  She is requiring IVF, IV analgesics, and close clinical monitoring.  IP status is appropriate.      The severity of illness, intensity of service provided, expected LOS and risk for adverse outcome make the care complex, high risk and appropriate for hospital admission.        The information on this document is developed by the utilization review team in order for the business office to ensure compliance.  This only denotes the appropriateness of proper admission status and does not reflect the quality of care rendered.         The definitions of Inpatient Status and Observation Status used in making the determination above are those " provided in the CMS Coverage Manual, Chapter 1 and Chapter 6, section 70.4.      Sincerely,     Meredith Early MD  Physician Advisor   Utilization Review/ Case Management  [employer].

## 2023-02-13 NOTE — PROGRESS NOTES
"    [facility]  Hospitalist Progress Note  Gene Schmitt MD 02/13/2023    Reason for Stay (Diagnosis): L distal femur fx         Assessment and Plan:      Summary of Stay: Florence Murray is a 72 year old female with a history of HTN, RLS, CVA, Afib, left TKA 2014 who presented to the ED with LLE pain after a fall.    Plans today:  - resumed prednisone 20 mg daily (taking for eczema)  - Hold lisinopril today to avoid hypotension during surgery.  Can resume tomorrow  - operative repair of femur fx anticipated today     Left distal femur periprosthetic fracture - s/p mechanical fall on the ice.  Imaging reveals a displaced distal femur fracture above her total knee arthroplasty with questionable slight comminution.  - Orthopedic Surgery consulted and plan for operative repair today  - analgesics prn  - PT/OT/SW consults     Chronic Atrial Fibrillation - diagnosed 2018 s/p left atrial appendage occlusion on 3/2/20.    - continue Atenolol.   - obtain preop EKG.   - monitor on telemetry  - Hold Eliquis prior to surgery; resume post-op when OK with orthopedic surgery.     Hx CVA - hx of CVA 2018 and 2021 while off Eliquis.      HTN - continue Amlodipine and Lisinopril     HLD - continue Rosuvastatin     Anxiety - continue Sertraline     Dermatitis - followed by Dermatology and is currently on a Prednisone taper for about 2 more weeks.  current dose is 20 mg daily        CODE: full  DVT ppx: scd  DISPO:  Anticipate POD 3, or when OK with ortho        Interval History (Subjective):      No complaints.  Has had 2 CVA while off Eliquis in past and hoping to resume medication as soon as safe to do so after surgery.                    Physical Exam:      Last Vital Signs:  /70 (BP Location: Right arm)   Pulse 78   Temp 97.2  F (36.2  C) (Temporal)   Resp 16   Ht 1.575 m (5' 2\")   Wt 92.8 kg (204 lb 9.6 oz)   SpO2 96%   BMI 37.42 kg/m        Intake/Output Summary (Last 24 hours) at 2/13/2023 " 1252  Last data filed at 2/13/2023 0558  Gross per 24 hour   Intake 1310 ml   Output 900 ml   Net 410 ml       Constitutional: Awake, alert, cooperative, no apparent distress   Respiratory: Clear to auscultation bilaterally, no crackles or wheezing   Cardiovascular: irreg rhythm, normal S1 and S2, and no murmur noted   Abdomen: Normal bowel sounds, soft, non-distended, non-tender   Skin: No rashes, no cyanosis, dry to touch   Neuro: Alert and oriented x3, no weakness, numbness, memory loss   Extremities: No edema, normal range of motion   Other(s): B feet warm to touch; sensation intact       All other systems: Negative          Medications:      All current medications were reviewed with changes reflected in problem list.         Data:      All new lab and imaging data was reviewed.   Labs:  Recent Labs   Lab 02/13/23  1033 02/12/23  1055   NA  --  138   POTASSIUM  --  3.9   CHLORIDE  --  106   CO2  --  23   ANIONGAP  --  9   * 103*   BUN  --  25.5*   CR  --  0.71   GFRESTIMATED  --  >90   ARTHUR  --  8.6*     Recent Labs   Lab 02/12/23  1055   WBC 11.2*   HGB 12.0   HCT 38.0   MCV 83         Imaging:   No results found for this or any previous visit (from the past 24 hour(s)).

## 2023-02-14 ENCOUNTER — APPOINTMENT (OUTPATIENT)
Dept: PHYSICAL THERAPY | Facility: CLINIC | Age: 73
DRG: 481 | End: 2023-02-14
Attending: PHYSICIAN ASSISTANT
Payer: COMMERCIAL

## 2023-02-14 LAB
HGB BLD-MCNC: 9.8 G/DL (ref 11.7–15.7)
MAGNESIUM SERPL-MCNC: 1.8 MG/DL (ref 1.7–2.3)
POTASSIUM SERPL-SCNC: 4.5 MMOL/L (ref 3.4–5.3)

## 2023-02-14 PROCEDURE — 97110 THERAPEUTIC EXERCISES: CPT | Mod: GP | Performed by: PHYSICAL THERAPIST

## 2023-02-14 PROCEDURE — 250N000013 HC RX MED GY IP 250 OP 250 PS 637: Performed by: INTERNAL MEDICINE

## 2023-02-14 PROCEDURE — 84132 ASSAY OF SERUM POTASSIUM: CPT | Performed by: INTERNAL MEDICINE

## 2023-02-14 PROCEDURE — 250N000011 HC RX IP 250 OP 636: Performed by: ORTHOPAEDIC SURGERY

## 2023-02-14 PROCEDURE — 97161 PT EVAL LOW COMPLEX 20 MIN: CPT | Mod: GP | Performed by: PHYSICAL THERAPIST

## 2023-02-14 PROCEDURE — 999N000111 HC STATISTIC OT IP EVAL DEFER

## 2023-02-14 PROCEDURE — 85018 HEMOGLOBIN: CPT | Performed by: INTERNAL MEDICINE

## 2023-02-14 PROCEDURE — 250N000013 HC RX MED GY IP 250 OP 250 PS 637: Performed by: ORTHOPAEDIC SURGERY

## 2023-02-14 PROCEDURE — 120N000001 HC R&B MED SURG/OB

## 2023-02-14 PROCEDURE — 250N000012 HC RX MED GY IP 250 OP 636 PS 637: Performed by: INTERNAL MEDICINE

## 2023-02-14 PROCEDURE — 36415 COLL VENOUS BLD VENIPUNCTURE: CPT | Performed by: INTERNAL MEDICINE

## 2023-02-14 PROCEDURE — 99232 SBSQ HOSP IP/OBS MODERATE 35: CPT | Performed by: INTERNAL MEDICINE

## 2023-02-14 PROCEDURE — 250N000013 HC RX MED GY IP 250 OP 250 PS 637: Performed by: ANESTHESIOLOGY

## 2023-02-14 PROCEDURE — 83735 ASSAY OF MAGNESIUM: CPT | Performed by: INTERNAL MEDICINE

## 2023-02-14 PROCEDURE — 97530 THERAPEUTIC ACTIVITIES: CPT | Mod: GP | Performed by: PHYSICAL THERAPIST

## 2023-02-14 RX ORDER — OXYCODONE HYDROCHLORIDE 5 MG/1
5-10 TABLET ORAL EVERY 4 HOURS PRN
Qty: 30 TABLET | Refills: 0 | Status: SHIPPED | OUTPATIENT
Start: 2023-02-14 | End: 2023-02-16

## 2023-02-14 RX ORDER — AMOXICILLIN 250 MG
1 CAPSULE ORAL 2 TIMES DAILY PRN
Qty: 20 TABLET | Refills: 0 | DISCHARGE
Start: 2023-02-14

## 2023-02-14 RX ORDER — ACETAMINOPHEN 325 MG/1
650 TABLET ORAL EVERY 4 HOURS PRN
Qty: 30 TABLET | Refills: 0 | DISCHARGE
Start: 2023-02-16

## 2023-02-14 RX ADMIN — SENNOSIDES AND DOCUSATE SODIUM 1 TABLET: 50; 8.6 TABLET ORAL at 21:08

## 2023-02-14 RX ADMIN — POLYETHYLENE GLYCOL 3350 17 G: 17 POWDER, FOR SOLUTION ORAL at 07:44

## 2023-02-14 RX ADMIN — SERTRALINE HYDROCHLORIDE 25 MG: 25 TABLET ORAL at 07:45

## 2023-02-14 RX ADMIN — SENNOSIDES AND DOCUSATE SODIUM 1 TABLET: 50; 8.6 TABLET ORAL at 07:44

## 2023-02-14 RX ADMIN — OXYCODONE HYDROCHLORIDE 10 MG: 10 TABLET ORAL at 22:12

## 2023-02-14 RX ADMIN — OXYCODONE HYDROCHLORIDE 10 MG: 10 TABLET ORAL at 03:28

## 2023-02-14 RX ADMIN — ATENOLOL 100 MG: 50 TABLET ORAL at 21:07

## 2023-02-14 RX ADMIN — OXYCODONE HYDROCHLORIDE 10 MG: 10 TABLET ORAL at 18:00

## 2023-02-14 RX ADMIN — OXYCODONE HYDROCHLORIDE 10 MG: 5 TABLET ORAL at 00:10

## 2023-02-14 RX ADMIN — OXYCODONE HYDROCHLORIDE 5 MG: 5 TABLET ORAL at 13:51

## 2023-02-14 RX ADMIN — ACETAMINOPHEN 975 MG: 325 TABLET ORAL at 03:28

## 2023-02-14 RX ADMIN — APIXABAN 5 MG: 5 TABLET, FILM COATED ORAL at 07:45

## 2023-02-14 RX ADMIN — APIXABAN 5 MG: 5 TABLET, FILM COATED ORAL at 21:07

## 2023-02-14 RX ADMIN — HYDROXYZINE HYDROCHLORIDE 25 MG: 25 TABLET ORAL at 22:13

## 2023-02-14 RX ADMIN — CEFAZOLIN SODIUM 2 G: 2 INJECTION, SOLUTION INTRAVENOUS at 07:46

## 2023-02-14 RX ADMIN — ACETAMINOPHEN 975 MG: 325 TABLET ORAL at 21:07

## 2023-02-14 RX ADMIN — PREDNISONE 20 MG: 20 TABLET ORAL at 07:45

## 2023-02-14 RX ADMIN — ACETAMINOPHEN 975 MG: 325 TABLET ORAL at 12:09

## 2023-02-14 RX ADMIN — OXYCODONE HYDROCHLORIDE 5 MG: 5 TABLET ORAL at 10:02

## 2023-02-14 ASSESSMENT — ACTIVITIES OF DAILY LIVING (ADL)
ADLS_ACUITY_SCORE: 25
ADLS_ACUITY_SCORE: 28
ADLS_ACUITY_SCORE: 25
ADLS_ACUITY_SCORE: 28
ADLS_ACUITY_SCORE: 25
ADLS_ACUITY_SCORE: 25

## 2023-02-14 NOTE — ANESTHESIA CARE TRANSFER NOTE
Patient: Florence Murray    Procedure: Procedure(s):  OPEN REDUCTION INTERNAL FIXATION, FRACTURE, FEMUR, DISTAL left periprosthetic with intramedullary nail.       Diagnosis: Closed fracture of distal end of left femur, unspecified fracture morphology, initial encounter (H) [S77.904Q]  Diagnosis Additional Information: No value filed.    Anesthesia Type:   General     Note:    Oropharynx: oropharynx clear of all foreign objects  Level of Consciousness: awake  Oxygen Supplementation: face mask  Level of Supplemental Oxygen (L/min / FiO2): 6  Independent Airway: airway patency satisfactory and stable  Dentition: dentition unchanged  Vital Signs Stable: post-procedure vital signs reviewed and stable  Report to RN Given: handoff report given  Patient transferred to: PACU    Handoff Report: Identifed the Patient, Identified the Reponsible Provider, Reviewed the pertinent medical history, Discussed the surgical course, Reviewed Intra-OP anesthesia mangement and issues during anesthesia, Set expectations for post-procedure period and Allowed opportunity for questions and acknowledgement of understanding      Vitals:  Vitals Value Taken Time   /70 02/13/23 1801   Temp     Pulse 99 02/13/23 1804   Resp 26 02/13/23 1804   SpO2 100 % 02/13/23 1804   Vitals shown include unvalidated device data.    Electronically Signed By: JAYME Baca CRNA  February 13, 2023  6:05 PM

## 2023-02-14 NOTE — PROGRESS NOTES
Occupational Therapy: Orders received. Chart reviewed and discussed with care team.?extended time discussing with patient and . Occupational Therapy not indicated while IP. appropriate to defer OT to next level of care TCU . Pt and  agreeable to this plan. Defer discharge recommendations to care team an PT.? Will complete orders.

## 2023-02-14 NOTE — PROGRESS NOTES
Care Management Follow Up    Length of Stay (days): 2    Expected Discharge Date: 02/16/2023     Concerns to be Addressed: care coordination/care conferences, discharge planning     Patient plan of care discussed at interdisciplinary rounds: Yes    Anticipated Discharge Disposition: Home Care     Anticipated Discharge Services: None  Anticipated Discharge DME: Ralf    Patient/family educated on Medicare website which has current facility and service quality ratings: yes    Patient/Family in Agreement with the Plan: yes    Additional Information:  Pt now recommending TCU.  Patient and  now in agreement.  Skilled Nursing Facility Planing Guide given and reviewed the Medicare.gov website.     Anne Marie Garrison RN, BSN, CM  Inpatient Care Coordination  [facility]  [phone]      Nanette Garrison RN

## 2023-02-14 NOTE — PLAN OF CARE
Goal Outcome Evaluation:         Patient vital signs are at baseline: No,  Reason:  2L O2  Patient able to ambulate as they were prior to admission or with assist devices provided by therapies during their stay:  No,  Reason:  Not yet oob. Non-weightbearing.  Patient MUST void prior to discharge:  No,  Reason:  Pineda  Patient able to tolerate oral intake:  Yes  Pain has adequate pain control using Oral analgesics:  Yes  Does patient have an identified :  Yes  Has goal D/C date and time been discussed with patient:  Yes      Patient aox4. Some confusion noted. Patient states she feels disassociated from reality. RN provided reassurance that sensation would fade with time. Tolerating regular diet. Denies nausea/vomiting. Immobilizer on at all times. Non-weightbearing to LLE. CMS intact. Denies numbness/tingling.     Restart eliquis tomorrow. PT at 0815.

## 2023-02-14 NOTE — PLAN OF CARE
Goal Outcome Evaluation:    Patient vital signs are at baseline: yes pt on tele Afib controlled   Patient able to ambulate as they were prior to admission or with assist devices provided by therapies during their stay: pt non WB on left leg. Immobilizer on. NOB  Patient MUST void prior to discharge: voiding good amounts in  jain catheter. Jain removed 5:30am. Due to void.   Patient able to tolerate oral intake:  Yes  Pain has adequate pain control using Oral analgesics:  Yes pain controlled with scheduled tylenol and PRN oxycodone.   Does patient have an identified :  Yes  Has goal D/C date and time been discussed with patient:  Yes     Cms intact. Dressing to surgical site CDI. Immobilizer on NWB to LLE.       Plan of Care Reviewed With: patient

## 2023-02-14 NOTE — PROGRESS NOTES
02/14/23 1117   Appointment Info   Signing Clinician's Name / Credentials (PT) Barber Macdonald DPT   Rehab Comments (PT) NWB L LE; KI on OOB; verbal per ortho okay for 0-60 degree ROM at rest   Living Environment   Living Environment Comments Pt will be staying in apt with spouse, elevator. Ind with mobility at baseline.   Self-Care   Usual Activity Tolerance good   Current Activity Tolerance fair   Equipment Currently Used at Home none;grab bar, tub/shower;raised toilet seat   Fall history within last six months yes   Number of times patient has fallen within last six months 1   General Information   Onset of Illness/Injury or Date of Surgery 02/12/23   Referring Physician Yony Carnes MD   Patient/Family Therapy Goals Statement (PT) To improve mobility, strengthening   Pertinent History of Current Problem (include personal factors and/or comorbidities that impact the POC) Pt is a 72 year old female with a history of HTN, RLS, CVA, Afib, left TKA 2014. Pt is POD1 s/p ORIF left distal femur periprosthetic fracture.   Existing Precautions/Restrictions brace worn when out of bed;weight bearing  (Per ortho- okay for 0-60 L knee ROM at rest)   Weight-Bearing Status - LLE nonweight-bearing   Cognition   Affect/Mental Status (Cognition) WFL   Orientation Status (Cognition) oriented x 4   Follows Commands (Cognition) WFL   Pain Assessment   Patient Currently in Pain Yes, see Vital Sign flowsheet  (L knee 3/10)   Range of Motion (ROM)   ROM Comment decreased L knee 2/2 surgical procedure; ROM restriction   Strength (Manual Muscle Testing)   Strength Comments unable to complete L SLR; able to complete R SLR   Bed Mobility   Comment, (Bed Mobility) Antonio supine <> sit   Transfers   Comment, (Transfers) Antonio sit <> stand with FWW   Gait/Stairs (Locomotion)   Distance in Feet 2   Pattern (Gait)   (hop to)   Comment, (Gait/Stairs) CGA   Balance   Balance Comments good sitting; fair standing with FWW   Clinical  Impression   Criteria for Skilled Therapeutic Intervention Yes, treatment indicated   PT Diagnosis (PT) impaired functional mobility   Influenced by the following impairments decreased L LE strength/ROM, balance, NWB   Functional limitations due to impairments impaired bed mobility, transfers, ambulation   Clinical Presentation (PT Evaluation Complexity) Stable/Uncomplicated   Clinical Presentation Rationale Pt is medically stable   Clinical Decision Making (Complexity) low complexity   Planned Therapy Interventions (PT) balance training;bed mobility training;cryotherapy;gait training;home exercise program;neuromuscular re-education;patient/family education;ROM (range of motion);strengthening;transfer training   Anticipated Equipment Needs at Discharge (PT) walker, rolling;wheelchair   Risk & Benefits of therapy have been explained evaluation/treatment results reviewed;care plan/treatment goals reviewed;risks/benefits reviewed;current/potential barriers reviewed;participants voiced agreement with care plan;participants included;patient;spouse/significant other   PT Total Evaluation Time   PT Eval, Low Complexity Minutes (95012) 9   Plan of Care Review   Plan of Care Reviewed With patient;spouse   Physical Therapy Goals   PT Frequency Daily   PT Predicted Duration/Target Date for Goal Attainment 02/16/23   PT Goals Bed Mobility;Transfers;Gait   PT: Bed Mobility Supervision/stand-by assist;Supine to/from sit;Within precautions   PT: Transfers Supervision/stand-by assist;Sit to/from stand;Assistive device;Within precautions   PT: Gait Supervision/stand-by assist;10 feet;Assistive device;Within precautions   PT Discharge Planning   PT Plan progress transfers, ambulation   PT Discharge Recommendation (DC Rec) home with assist;home with home care physical therapy   PT Rationale for DC Rec PT recommending TCU, pt/spouse wanting to return home. They have ideal living setup with no stairs to navigate. They will need walker,  w/c, commode and pt will require Ax1 with all mobility at this time. Will continue to update recs. Pt will need HHPT/OT to progress mobility/assess home safety.   PT Brief overview of current status Ax1-2 pivot transfers with FWW and R shoe on   Total Session Time   Total Session Time (sum of timed and untimed services) 9

## 2023-02-14 NOTE — OP NOTE
Procedure Date: 02/13/2023    PREOPERATIVE DIAGNOSES:    1.  Comminuted left supracondylar periprosthetic distal femur fracture.  2.  Obesity (body mass index equals 37) with large leg size.  3.  Status post left total knee replacement.  4.  Osteopenia.    POSTOPERATIVE DIAGNOSES:    1.  Comminuted left supracondylar periprosthetic distal femur fracture.  2.  Obesity (body mass index equals 37) with large leg size.  3.  Status post left total knee replacement.  4.  Osteopenia.    PROCEDURE PERFORMED:  Open reduction with internal fixation, left periprosthetic distal femur fracture.    SURGEON:  Yony Carnes MD    ASSISTANT:  Alexus Reilly PA-C    ANESTHESIA:  General.    TOURNIQUET TIME:  Approximately 110 minutes.    ESTIMATED BLOOD LOSS:  200 mL.    COMPLICATIONS:  None.    DESCRIPTION OF PROCEDURE:  The patient was taken to the operating room, where, after administration of tranexamic acid, antibiotic prophylaxis and sterile prep and drape, the leg was exsanguinated, and an anterior-anterior incision was made followed by her previous medial arthrotomy.  Hematoma was evacuated.  The fracture was severely comminuted.  The femoral component was a ceramic component with an altered design and minimal box.  For this reason, I elected to proceed with plate fixation.  Lateral incision was made, followed by splitting the fascia and exposing the fracture.  Indirect reduction technique was used, and the fracture fragments were generally left undisturbed with overall alignment and placement of a lateral plate with appropriate proximal and distal screws.  I attempted to make the construct somewhat flexible due to the comminution to promote bone growth.  After solid fixation, intraoperative AP and lateral views of the knee and femur showed excellent hardware alignment and position with no complications and overall excellent alignment.  At this point, copious pulsatile lavage was done through both incisions.  A Betadine  soak was done into the knee, and 1 gram of vancomycin powder was placed in the knee and fracture site followed by a layered anatomic closure.  A sterile dressing and knee immobilizer were placed.  There were no complications.    Yony Carnes MD        D: 2023   T: 2023   MT: [id number]    Name:     NELLY GILMORE  MRN:      [id number]        Account:        [id number]   :      1950           Procedure Date: 2023     Document: [id number]

## 2023-02-14 NOTE — ANESTHESIA POSTPROCEDURE EVALUATION
Patient: Florence Murray    Procedure: Procedure(s):  OPEN REDUCTION INTERNAL FIXATION, FRACTURE, FEMUR, DISTAL left periprosthetic with intramedullary nail.       Anesthesia Type:  General    Note:     Postop Pain Control: Uneventful            Sign Out: Well controlled pain   PONV: No   Neuro/Psych: Uneventful            Sign Out: Acceptable/Baseline neuro status   Airway/Respiratory: Uneventful            Sign Out: Acceptable/Baseline resp. status   CV/Hemodynamics: Uneventful            Sign Out: Acceptable CV status   Other NRE: NONE   DID A NON-ROUTINE EVENT OCCUR? No           Last vitals:  Vitals Value Taken Time   /75 02/13/23 1900   Temp 97.3  F (36.3  C) 02/13/23 1900   Pulse 99 02/13/23 1909   Resp 13 02/13/23 1909   SpO2 96 % 02/13/23 1909   Vitals shown include unvalidated device data.    Electronically Signed By: Morgan Handy MD  February 13, 2023  7:53 PM

## 2023-02-14 NOTE — PROGRESS NOTES
Care Management Follow Up    Length of Stay (days): 2    Expected Discharge Date: 02/15/2023     Concerns to be Addressed: care coordination/care conferences, discharge planning     Patient plan of care discussed at interdisciplinary rounds: No    Anticipated Discharge Disposition: Home Care     Anticipated Discharge Services: None  Anticipated Discharge DME: Walker    Patient/family educated on Medicare website which has current facility and service quality ratings: yes  Education Provided on the Discharge Plan:    Patient/Family in Agreement with the Plan: yes    Referrals Placed by CM/SW: Homecare  Private pay costs discussed: Not applicable    Additional Information:    CM met with pt at bedside to obtain TCU choices. Referrals sent to [facility], [facility], and [facility].     YOGI Terry, ALEYDA  Inpatient Care Coordination  Ortho[facility]Sp[facility] Unit  [phone]  Citlalli Zambrano, ALEYDA

## 2023-02-14 NOTE — PROGRESS NOTES
"    [facility]  Hospitalist Progress Note  Gene Schmitt MD 02/14/2023    Reason for Stay (Diagnosis): L femur fx         Assessment and Plan:      Summary of Stay: Florence Murray is a 72 year old female with a history of HTN, RLS, CVA, Afib, left TKA 2014 who presented to the ED with LLE pain after a fall.    The patient was found to have L femur fx.  She underwent ORIF on 2/13     Plans today:  - have ordered post-op hgb for today and tomorrow     Left distal femur periprosthetic fracture - s/p mechanical fall on the ice.  Imaging reveals a displaced distal femur fracture above her total knee arthroplasty with questionable slight comminution.  - Orthopedic Surgery consulted and performed ORIF on 2/13  - analgesics prn  - PT/OT/SW consults     Chronic Atrial Fibrillation - diagnosed 2018 s/p left atrial appendage occlusion on 3/2/20.    - continue Atenolol.   - Eliquis has been resumed.     Hx CVA - hx of CVA 2018 and 2021 while off Eliquis.      HTN - continue Amlodipine and Lisinopril     HLD - continue Rosuvastatin     Anxiety - continue Sertraline     Dermatitis - followed by Dermatology and is currently on a Prednisone taper for about 2 more weeks.  current dose is 20 mg daily        CODE: full  DVT ppx: doac  DISPO:  Anticipate POD 3 to TCU vs home, when OK with ortho.             Interval History (Subjective):      No new concerns today.  Is receiving her prednisone and Eliquis.  Lisinopril held this AM due to soft BP                  Physical Exam:      Last Vital Signs:  /63 (BP Location: Right arm)   Pulse 73   Temp 97  F (36.1  C) (Temporal)   Resp 17   Ht 1.575 m (5' 2\")   Wt 92.8 kg (204 lb 9.6 oz)   SpO2 96%   BMI 37.42 kg/m        Intake/Output Summary (Last 24 hours) at 2/14/2023 1254  Last data filed at 2/14/2023 1206  Gross per 24 hour   Intake 2140 ml   Output 3125 ml   Net -985 ml       Constitutional: Awake, alert, cooperative, no apparent distress "   Respiratory: Clear to auscultation bilaterally, no crackles or wheezing   Cardiovascular: Regular rate and rhythm, normal S1 and S2, and no murmur noted   Abdomen: Normal bowel sounds, soft, non-distended, non-tender   Skin: No rashes, no cyanosis, dry to touch   Neuro: Alert and oriented x3, no weakness, numbness, memory loss   Extremities: No edema, normal range of motion   Other(s): BLE warm to touch, B toes mobile, B feet sensation intact       All other systems: Negative          Medications:      All current medications were reviewed with changes reflected in problem list.         Data:      All new lab and imaging data was reviewed.   Labs:  Recent Labs   Lab 02/14/23  0644 02/13/23  1033 02/12/23  1055   NA  --   --  138   POTASSIUM 4.5  --  3.9   CHLORIDE  --   --  106   CO2  --   --  23   ANIONGAP  --   --  9   GLC  --  101* 103*   BUN  --   --  25.5*   CR  --   --  0.71   GFRESTIMATED  --   --  >90   ARTHUR  --   --  8.6*     Recent Labs   Lab 02/14/23  0934 02/12/23  1055   WBC  --  11.2*   HGB 9.8* 12.0   HCT  --  38.0   MCV  --  83   PLT  --  271      Imaging:   Recent Results (from the past 24 hour(s))   XR Surgery JACINTO L/T 5 Min Fluoro w Stills    Narrative    This exam was marked as non-reportable because it will not be read by a   radiologist or a [facility] non-radiologist provider.

## 2023-02-14 NOTE — PROGRESS NOTES
Orthopedic Surgery  Florence Murray  02/14/2023     Admit Date:  2/12/2023  POD: 1 Day Post-Op   Procedure(s):  Open reduction with internal fixation, left periprosthetic distal femur fracture    Alert and oriented.   Patient resting comfortably in bed.    Pain controlled with Oxycodone.  Tolerating oral intake.    Denies nausea or vomiting  Denies chest pain or shortness of breath    Temp:  [96.5  F (35.8  C)-97.6  F (36.4  C)] 97  F (36.1  C)  Pulse:  [] 16  Resp:  [8-25] 17  BP: (111-160)/(60-92) 111/63  SpO2:  [90 %-100 %] 96 %    Dressing is clean, dry, and intact.  KI in place.   Minimal erythema of the surrounding skin.   Bilateral calves are soft, non-tender.  Left lower extremity is NVI.  Sensation intact bilateral lower extremities  Patient able to resist dorsi and plantar flexion bilaterally  +Dp pulse    Labs:  Recent Labs   Lab Test 02/14/23  0934 02/12/23  1055   WBC  --  11.2*   HGB 9.8* 12.0   PLT  --  271     Recent Labs   Lab Test 01/28/22  1525   INR 1.1       1. PLAN:   Continue Eliquis for DVT prophylaxis.     Mobilize with PT/OT    NON-WB Left LE with KI applied.  Ok to have KI off in bed and gentle ROM 0-60 degrees.  No pillow under knee to avoid contracture.      Continue current pain regiment.   Dressings: Keep intact.  Change if >60% saturated or peeling off.    Follow-up: 2 weeks post-op with Dr Carnes team    2. Disposition   Anticipate d/c to TCU 1-2 days when medically cleared and progressing in PT.    Hoa Russell PA-C

## 2023-02-14 NOTE — PLAN OF CARE
Goal Outcome Evaluation:      Plan of Care Reviewed With: patient, spouse    Overall Patient Progress: improving    Patient vital signs are at baseline: Yes  Patient able to ambulate as they were prior to admission or with assist devices provided by therapies during their stay:  No,  Reason:  Pt Ax2, using gait belt, and walker to bedside commode. KI on when OOB, non weight bearing.  Patient MUST void prior to discharge:  Yes  Patient able to tolerate oral intake:  Yes-regular diet.  Pain has adequate pain control using Oral analgesics:  Yes-PO oxycodone and scheduled tylenol.    Pt A&O x4. CMS intact. Dressing-small amt of dried drainage, ACE wrap removed. Plan is TCU @ discharge.

## 2023-02-15 ENCOUNTER — APPOINTMENT (OUTPATIENT)
Dept: PHYSICAL THERAPY | Facility: CLINIC | Age: 73
DRG: 481 | End: 2023-02-15
Payer: COMMERCIAL

## 2023-02-15 LAB
HGB BLD-MCNC: 9.2 G/DL (ref 11.7–15.7)
MAGNESIUM SERPL-MCNC: 1.8 MG/DL (ref 1.7–2.3)
POTASSIUM SERPL-SCNC: 4.2 MMOL/L (ref 3.4–5.3)

## 2023-02-15 PROCEDURE — 250N000013 HC RX MED GY IP 250 OP 250 PS 637: Performed by: ORTHOPAEDIC SURGERY

## 2023-02-15 PROCEDURE — 36415 COLL VENOUS BLD VENIPUNCTURE: CPT | Performed by: INTERNAL MEDICINE

## 2023-02-15 PROCEDURE — 99231 SBSQ HOSP IP/OBS SF/LOW 25: CPT | Performed by: INTERNAL MEDICINE

## 2023-02-15 PROCEDURE — 85018 HEMOGLOBIN: CPT | Performed by: INTERNAL MEDICINE

## 2023-02-15 PROCEDURE — 97530 THERAPEUTIC ACTIVITIES: CPT | Mod: GP | Performed by: PHYSICAL THERAPIST

## 2023-02-15 PROCEDURE — 250N000012 HC RX MED GY IP 250 OP 636 PS 637: Performed by: INTERNAL MEDICINE

## 2023-02-15 PROCEDURE — 83735 ASSAY OF MAGNESIUM: CPT | Performed by: INTERNAL MEDICINE

## 2023-02-15 PROCEDURE — 97110 THERAPEUTIC EXERCISES: CPT | Mod: GP | Performed by: PHYSICAL THERAPIST

## 2023-02-15 PROCEDURE — 250N000013 HC RX MED GY IP 250 OP 250 PS 637: Performed by: INTERNAL MEDICINE

## 2023-02-15 PROCEDURE — 84132 ASSAY OF SERUM POTASSIUM: CPT | Performed by: INTERNAL MEDICINE

## 2023-02-15 PROCEDURE — 120N000001 HC R&B MED SURG/OB

## 2023-02-15 RX ADMIN — APIXABAN 5 MG: 5 TABLET, FILM COATED ORAL at 20:04

## 2023-02-15 RX ADMIN — ACETAMINOPHEN 975 MG: 325 TABLET ORAL at 06:21

## 2023-02-15 RX ADMIN — OXYCODONE HYDROCHLORIDE 10 MG: 10 TABLET ORAL at 02:17

## 2023-02-15 RX ADMIN — ATENOLOL 100 MG: 50 TABLET ORAL at 22:35

## 2023-02-15 RX ADMIN — HYDROXYZINE HYDROCHLORIDE 25 MG: 25 TABLET ORAL at 22:36

## 2023-02-15 RX ADMIN — OXYCODONE HYDROCHLORIDE 10 MG: 10 TABLET ORAL at 06:21

## 2023-02-15 RX ADMIN — ACETAMINOPHEN 975 MG: 325 TABLET ORAL at 11:39

## 2023-02-15 RX ADMIN — PREDNISONE 20 MG: 20 TABLET ORAL at 09:24

## 2023-02-15 RX ADMIN — ACETAMINOPHEN 975 MG: 325 TABLET ORAL at 20:04

## 2023-02-15 RX ADMIN — SERTRALINE HYDROCHLORIDE 25 MG: 25 TABLET ORAL at 09:23

## 2023-02-15 RX ADMIN — SENNOSIDES AND DOCUSATE SODIUM 1 TABLET: 50; 8.6 TABLET ORAL at 09:23

## 2023-02-15 RX ADMIN — SENNOSIDES AND DOCUSATE SODIUM 1 TABLET: 50; 8.6 TABLET ORAL at 20:04

## 2023-02-15 RX ADMIN — APIXABAN 5 MG: 5 TABLET, FILM COATED ORAL at 09:22

## 2023-02-15 RX ADMIN — OXYCODONE HYDROCHLORIDE 5 MG: 5 TABLET ORAL at 18:24

## 2023-02-15 RX ADMIN — LISINOPRIL 40 MG: 40 TABLET ORAL at 09:23

## 2023-02-15 RX ADMIN — OXYCODONE HYDROCHLORIDE 10 MG: 10 TABLET ORAL at 22:36

## 2023-02-15 RX ADMIN — OXYCODONE HYDROCHLORIDE 10 MG: 10 TABLET ORAL at 09:22

## 2023-02-15 RX ADMIN — PSYLLIUM HUSK 1 PACKET: 3.4 POWDER ORAL at 09:25

## 2023-02-15 RX ADMIN — OXYCODONE HYDROCHLORIDE 10 MG: 10 TABLET ORAL at 13:39

## 2023-02-15 ASSESSMENT — ACTIVITIES OF DAILY LIVING (ADL)
ADLS_ACUITY_SCORE: 27
ADLS_ACUITY_SCORE: 27
ADLS_ACUITY_SCORE: 25
ADLS_ACUITY_SCORE: 27
ADLS_ACUITY_SCORE: 25
ADLS_ACUITY_SCORE: 25
ADLS_ACUITY_SCORE: 27
ADLS_ACUITY_SCORE: 25
ADLS_ACUITY_SCORE: 27
ADLS_ACUITY_SCORE: 25
ADLS_ACUITY_SCORE: 27
ADLS_ACUITY_SCORE: 25

## 2023-02-15 NOTE — PROGRESS NOTES
Pt is alert and oriented, lung sounds clear, up with assist of one walker and gait /KI when OOB.NWB -LLE.Pain controlled with prn Oxy and schedule tylenol.Dressing is clean dry and intact/denies N/T. Voiding adequate amount using a bed side commode. Plan for tcu discharge ,awaiting placement.

## 2023-02-15 NOTE — PROGRESS NOTES
Care Management Follow Up    Length of Stay (days): 3    Expected Discharge Date: 02/16/2023     Concerns to be Addressed: care coordination/care conferences, discharge planning     Patient plan of care discussed at interdisciplinary rounds: Yes    Anticipated Discharge Disposition: TCU     Anticipated Discharge Services: None  Anticipated Discharge DME: Ralf    Patient/family educated on Medicare website which has current facility and service quality ratings: yes  Education Provided on the Discharge Plan:    Patient/Family in Agreement with the Plan: yes    Referrals Placed by CM/SW: post acute facilities  Private pay costs discussed: Not applicable    Additional Information:  Patient was accepted at [facility]. [employer] prior auth to be completed. Patient was notified.  [employer] auth [id number].    1440 addendum  [facility] no longer has an open bed tmrw.  Referrals sent and accepted at [facility].  Transportation via [facility] w/c ride at 1300.  [employer] notified of facility change and NPI updated to reflect new facility.    Anne Marie Garrison, RN, BSN, CM  Inpatient Care Coordination  [facility]  [phone]

## 2023-02-15 NOTE — PROGRESS NOTES
Orthopedic Surgery  Florence Murray  02/15/2023     Admit Date:  2/12/2023  POD: 2 Days Post-Op   Procedure(s):  Open reduction with internal fixation, left periprosthetic distal femur fracture    Alert and oriented.   Patient resting comfortably in bed.    Pain controlled with Oxycodone.  Tolerating oral intake.    Denies nausea or vomiting  Denies chest pain or shortness of breath    Temp:  [97.2  F (36.2  C)-97.5  F (36.4  C)] 97.2  F (36.2  C)  Pulse:  [62-99] 62  Resp:  [14-20] 20  BP: (112-142)/(53-66) 121/65  SpO2:  [94 %-96 %] 96 %    Dressing is intact, scant drainage along lateral dressing.  KI in place.   Minimal erythema of the surrounding skin.   Bilateral calves are soft, mild tenderness left posterior calf. Pascale's negative.   Left lower extremity is NVI.  Sensation intact bilateral lower extremities  Patient able to resist dorsi and plantar flexion bilaterally  +Dp pulse    Labs:  Recent Labs   Lab Test 02/15/23  0616 02/14/23  0934 02/12/23  1055   WBC  --   --  11.2*   HGB 9.2* 9.8* 12.0   PLT  --   --  271     Recent Labs   Lab Test 01/28/22  1525   INR 1.1       1. PLAN:   Continue Eliquis for DVT prophylaxis.     Mobilize with PT/OT    NON-WB Left LE with KI applied.  Ok to have KI off in bed and gentle ROM 0-60 degrees.  No pillow under knee to avoid contracture.     Posterior KI padded with ABD to avoid pressure injury.    Continue current pain regiment.   Dressings: Keep intact.  Change if >60% saturated or peeling off.    Follow-up: 2 weeks post-op with Dr Carnes team    2. Disposition   Anticipate d/c to TCU tomorrow.     Hoa Russell PA-C

## 2023-02-15 NOTE — PROGRESS NOTES
[facility]  Hospitalist Progress Note  Gene Schmitt MD 02/15/2023    Reason for Stay (Diagnosis): L distal femur fx         Assessment and Plan:      Summary of Stay: Florence Murray is a 72 year old female with a history of HTN, RLS, CVA, Afib, left TKA 2014 who presented to the ED with LLE pain after a fall.     The patient was found to have L femur fx.  She underwent ORIF on 2/13     Plans today:  - no significant changes in plans today  - pt requesting TCU on discharge; SW assisting with placement     Left distal femur periprosthetic fracture - s/p mechanical fall on the ice.  Imaging reveals a displaced distal femur fracture above her total knee arthroplasty with questionable slight comminution.  - Orthopedic Surgery consulted and performed ORIF on 2/13  - analgesics prn  - PT/OT/SW consults     Chronic Atrial Fibrillation - diagnosed 2018 s/p left atrial appendage occlusion on 3/2/20.    - continue Atenolol.   - Eliquis has been resumed.     Hx CVA - hx of CVA 2018 and 2021 while off Eliquis.      HTN - continue Amlodipine and Lisinopril     HLD - continue Rosuvastatin     Anxiety - continue Sertraline     Dermatitis - followed by Dermatology and is currently on a Prednisone taper for about 2 more weeks.  current dose is 20 mg daily        CODE: full  DVT ppx: doac  DISPO:  TCU on discharge, likely POD 3.             Interval History (Subjective):      Pt is frustrated by what she feels is slow improvement and lack of mobility.  Views this as a significant setback for her and she is not sure she can eventually make it back to her previous baseline.  Having pain when trying to mobilize.  Is also having issues with her diet as she has had several occurances where she did not receive what she ordered.  She has been talking with nutrition services about this                  Physical Exam:      Last Vital Signs:  /65 (BP Location: Right arm)   Pulse 62   Temp 97.2  F (36.2  C)  "(Temporal)   Resp 20   Ht 1.575 m (5' 2\")   Wt 92.8 kg (204 lb 9.6 oz)   SpO2 96%   BMI 37.42 kg/m      I/O last 3 completed shifts:  In: 240 [P.O.:240]  Out: 1200 [Urine:1200]    Constitutional: Awake, alert, cooperative, no apparent distress   Respiratory: Clear to auscultation bilaterally, no crackles or wheezing   Cardiovascular: Regular rate and rhythm, normal S1 and S2, and no murmur noted   Abdomen: Normal bowel sounds, soft, non-distended, non-tender   Skin: No rashes, no cyanosis, dry to touch   Neuro: Alert and oriented x3, no weakness, numbness, memory loss   Extremities: No edema, normal range of motion   Other(s): B feet warm, sensation intact, normal plantar and dorsiflex strength       All other systems: Negative          Medications:      All current medications were reviewed with changes reflected in problem list.         Data:      All new lab and imaging data was reviewed.   Labs:  Recent Labs   Lab 02/15/23  0616 02/14/23  0644 02/13/23  1033 02/12/23  1055   NA  --   --   --  138   POTASSIUM 4.2   < >  --  3.9   CHLORIDE  --   --   --  106   CO2  --   --   --  23   ANIONGAP  --   --   --  9   GLC  --   --  101* 103*   BUN  --   --   --  25.5*   CR  --   --   --  0.71   GFRESTIMATED  --   --   --  >90   ARTHUR  --   --   --  8.6*    < > = values in this interval not displayed.     Recent Labs   Lab 02/15/23  0616 02/14/23  0934 02/12/23  1055   WBC  --   --  11.2*   HGB 9.2*   < > 12.0   HCT  --   --  38.0   MCV  --   --  83   PLT  --   --  271    < > = values in this interval not displayed.      Imaging:   No results found for this or any previous visit (from the past 24 hour(s)).   "

## 2023-02-16 VITALS
DIASTOLIC BLOOD PRESSURE: 69 MMHG | HEIGHT: 62 IN | BODY MASS INDEX: 37.65 KG/M2 | TEMPERATURE: 96.5 F | RESPIRATION RATE: 18 BRPM | WEIGHT: 204.6 LBS | HEART RATE: 78 BPM | SYSTOLIC BLOOD PRESSURE: 137 MMHG | OXYGEN SATURATION: 95 %

## 2023-02-16 LAB
MAGNESIUM SERPL-MCNC: 1.8 MG/DL (ref 1.7–2.3)
POTASSIUM SERPL-SCNC: 4.1 MMOL/L (ref 3.4–5.3)

## 2023-02-16 PROCEDURE — 36415 COLL VENOUS BLD VENIPUNCTURE: CPT | Performed by: INTERNAL MEDICINE

## 2023-02-16 PROCEDURE — 83735 ASSAY OF MAGNESIUM: CPT | Performed by: INTERNAL MEDICINE

## 2023-02-16 PROCEDURE — 250N000013 HC RX MED GY IP 250 OP 250 PS 637: Performed by: INTERNAL MEDICINE

## 2023-02-16 PROCEDURE — 250N000013 HC RX MED GY IP 250 OP 250 PS 637: Performed by: ORTHOPAEDIC SURGERY

## 2023-02-16 PROCEDURE — 84132 ASSAY OF SERUM POTASSIUM: CPT | Performed by: INTERNAL MEDICINE

## 2023-02-16 PROCEDURE — 99238 HOSP IP/OBS DSCHRG MGMT 30/<: CPT | Performed by: INTERNAL MEDICINE

## 2023-02-16 PROCEDURE — 250N000012 HC RX MED GY IP 250 OP 636 PS 637: Performed by: INTERNAL MEDICINE

## 2023-02-16 RX ORDER — ATENOLOL 100 MG/1
100 TABLET ORAL DAILY
DISCHARGE
Start: 2023-02-16

## 2023-02-16 RX ORDER — LISINOPRIL 40 MG/1
40 TABLET ORAL DAILY
DISCHARGE
Start: 2023-02-16

## 2023-02-16 RX ORDER — AMLODIPINE BESYLATE 2.5 MG/1
2.5 TABLET ORAL DAILY
DISCHARGE
Start: 2023-02-16

## 2023-02-16 RX ORDER — PANTOPRAZOLE SODIUM 40 MG/1
40 TABLET, DELAYED RELEASE ORAL
Status: DISCONTINUED | OUTPATIENT
Start: 2023-02-16 | End: 2023-02-16 | Stop reason: HOSPADM

## 2023-02-16 RX ORDER — PREDNISONE 5 MG/1
TABLET ORAL
DISCHARGE
Start: 2023-02-16

## 2023-02-16 RX ORDER — OXYCODONE HYDROCHLORIDE 5 MG/1
5-10 TABLET ORAL EVERY 4 HOURS PRN
Qty: 30 TABLET | Refills: 0 | Status: SHIPPED | OUTPATIENT
Start: 2023-02-16

## 2023-02-16 RX ORDER — PANTOPRAZOLE SODIUM 40 MG/1
40 TABLET, DELAYED RELEASE ORAL DAILY
DISCHARGE
Start: 2023-02-16

## 2023-02-16 RX ADMIN — SERTRALINE HYDROCHLORIDE 25 MG: 25 TABLET ORAL at 08:25

## 2023-02-16 RX ADMIN — PSYLLIUM HUSK 1 PACKET: 3.4 POWDER ORAL at 08:26

## 2023-02-16 RX ADMIN — PREDNISONE 20 MG: 20 TABLET ORAL at 08:25

## 2023-02-16 RX ADMIN — ACETAMINOPHEN 975 MG: 325 TABLET ORAL at 11:50

## 2023-02-16 RX ADMIN — LISINOPRIL 40 MG: 40 TABLET ORAL at 08:25

## 2023-02-16 RX ADMIN — PANTOPRAZOLE SODIUM 40 MG: 40 TABLET, DELAYED RELEASE ORAL at 11:50

## 2023-02-16 RX ADMIN — OXYCODONE HYDROCHLORIDE 5 MG: 5 TABLET ORAL at 04:20

## 2023-02-16 RX ADMIN — OXYCODONE HYDROCHLORIDE 5 MG: 5 TABLET ORAL at 08:25

## 2023-02-16 RX ADMIN — APIXABAN 5 MG: 5 TABLET, FILM COATED ORAL at 08:25

## 2023-02-16 RX ADMIN — SENNOSIDES AND DOCUSATE SODIUM 1 TABLET: 50; 8.6 TABLET ORAL at 08:26

## 2023-02-16 RX ADMIN — ACETAMINOPHEN 975 MG: 325 TABLET ORAL at 04:16

## 2023-02-16 RX ADMIN — AMLODIPINE BESYLATE 2.5 MG: 2.5 TABLET ORAL at 08:25

## 2023-02-16 RX ADMIN — OXYCODONE HYDROCHLORIDE 5 MG: 5 TABLET ORAL at 12:27

## 2023-02-16 ASSESSMENT — ACTIVITIES OF DAILY LIVING (ADL)
ADLS_ACUITY_SCORE: 27

## 2023-02-16 NOTE — PLAN OF CARE
Goal Outcome Evaluation:  .Patient vital signs are at baseline: Yes  Patient able to ambulate as they were prior to admission or with assist devices provided by therapies during their stay:  No,  Reason: pivot to bedside commode  Patient MUST void prior to discharge:  Yes  Patient able to tolerate oral intake:  Yes  Pain has adequate pain control using Oral analgesics:  Yes with oxycodone  Does patient have an identified :  Yes  Has goal D/C date and time been discussed with patient:  Yes to TCU on 2/16  On tele Afib controlled 70, knee immobilizer with assist of two ,

## 2023-02-16 NOTE — PROGRESS NOTES
Care Management Discharge Note    Discharge Date: 02/16/2023       Discharge Disposition: Home Care    Discharge Services: None    Discharge DME: Walker    Discharge Transportation: family or friend will provide    Private pay costs discussed: private room/amenity fees and transportation costs    PAS Confirmation Code:    Patient/family educated on Medicare website which has current facility and service quality ratings: yes    Education Provided on the Discharge Plan:    Persons Notified of Discharge Plans: pt, RN, charge, facility  Patient/Family in Agreement with the Plan: yes    Handoff Referral Completed: No    Additional Information:    Pt is [facility] for [facility] via [employer] [facility] at 1300 today 2/16. PAS completed. Orders faxed. Auth approval faxed to Chris at [facility].       YOGI Terry, SW  Inpatient Care Coordination  Ortho/Spine Unit  [phone]  Citlalli Zambrano, CHRISTIANO

## 2023-02-16 NOTE — PLAN OF CARE
Goal Outcome Evaluation:      Plan of Care Reviewed With: patient    Overall Patient Progress: improvingOverall Patient Progress: improving         Cared [id number]    VSS. Afebrile. AO x4. Room air. IVF SL. Voiding. No BM this shift. Pt. Gassing gas. Senna and Metamucil. Surgical dressing CDI with a small amount of drainage. Pt. Is up with pivot to bedside commode, AO2 with use of walker and gait belt. Knee immobilizer in place. K/Mag protocol. Oral oxycodone for pain management. Discharge plan is to TCU tomorrow, 2/16.

## 2023-02-16 NOTE — PROGRESS NOTES
Orthopedic Surgery  Florence Murray  02/16/2023     Admit Date:  2/12/2023  POD: 3 Days Post-Op   Procedure(s):  Open reduction with internal fixation, left periprosthetic distal femur fracture    Alert and oriented.   Patient resting comfortably in bed.    Pain controlled with Oxycodone.  Tolerating oral intake.    Denies nausea or vomiting  Denies chest pain or shortness of breath    Temp:  [96.5  F (35.8  C)-98.5  F (36.9  C)] 96.5  F (35.8  C)  Pulse:  [68-96] 78  Resp:  [18-20] 18  BP: (114-137)/(62-70) 137/69  SpO2:  [95 %-98 %] 95 %    Dressing is intact, lateral dressing changed.   Minimal erythema of the surrounding skin.   Bilateral calves are soft, mild tenderness left posterior calf. Pascale's negative.   Left lower extremity is NVI.  Sensation intact bilateral lower extremities  Patient able to resist dorsi and plantar flexion bilaterally  +Dp pulse    Labs:  Recent Labs   Lab Test 02/15/23  0616 02/14/23  0934 02/12/23  1055   WBC  --   --  11.2*   HGB 9.2* 9.8* 12.0   PLT  --   --  271     Recent Labs   Lab Test 01/28/22  1525   INR 1.1       1. Plan:   Continue Eliquis for DVT prophylaxis.     Mobilize with PT/OT    NON-WB Left LE with KI applied.  Ok to have KI off in bed and gentle ROM 0-60 degrees.  No pillow under knee to avoid contracture.     Posterior KI padded with ABD to avoid pressure injury. Keep in place.  Change PRN    Continue current pain regiment.   Dressings: Keep intact.  Change if >60% saturated or peeling off.    Follow-up: 2 weeks post-op with Dr Carnes team    2. Disposition   Anticipate d/c to TCU today.      Hoa Russell PA-C

## 2023-02-16 NOTE — PROGRESS NOTES
Cared for pt. [id number]    Patient vital signs are at baseline: Yes  Patient able to ambulate as they were prior to admission or with assist devices provided by therapies during their stay:  Yes, with use of immobilizer and walker. NWB LLE.  Patient MUST void prior to discharge:  Yes, Voiding. BM x2 this shift.   Patient able to tolerate oral intake:  Yes  Pain has adequate pain control using Oral analgesics:  Yes, oral oxycodone used for pain management.  Does patient have an identified :  Yes,  (Jose E) at bedside.   Has goal D/C date and time been discussed with patient:  Yes, TCU today 2/16 at 1300.    Pt. Left the floor via WC transport today at 1300. IV removed. Knee immobilizer in place. Dressing CDI. Tele removed. Oral oxycodone given for pain mangement at 1230. Belongings sent with .

## 2023-02-16 NOTE — DISCHARGE SUMMARY
Admit Date: 02/12/2023  Discharge Date: 02/16/2023    PRINCIPAL FINAL DIAGNOSES:    1.  Left distal femur fracture after a mechanical fall on ice.  The patient underwent open reduction and internal fixation this admission.  2.  History of left total knee arthroplasty.  3.  Chronic atrial fibrillation maintained on Eliquis for anticoagulation.  4.  History of cerebrovascular accident in 2018 and 2021 while off anticoagulation.  No residual symptoms.  5.  Hypertension.  6.  Hyperlipidemia.  7.  Anxiety.  8.  History of dermatitis, currently being managed with prednisone taper.    PRINCIPAL PROCEDURES THIS ADMISSION:    1.  Orthopedics consultation.  2.  Operative intervention of distal femur fracture.  3.  Physical therapy and occupational therapy consultation.  4.  Social Work consult to assist with disposition.    REASON FOR ADMISSION:  Please see dictated history and physical for details.  In brief, the patient had a mechanical fall on the ice.  She had significant left leg pain.  She came to the ER for evaluation and was found to have a distal femur fracture.    HOSPITAL COURSE:  Distal femur fracture:  The patient was seen by Orthopedic Surgery.  She underwent open reduction and internal fixation.  Surgery was tolerated well.  She did have some acute blood loss anemia related to fracture and surgery but did not require blood transfusion.  She is being discharged to a transitional care unit today.    DISCHARGE MEDICATIONS:    1.  Acetaminophen as needed for pain.  2.  Oxycodone 5-10 mg every 4 hours as needed for pain.  3.  Pantoprazole 40 mg daily.  4.  Psyllium 1 packet daily.  5.  Senokot 1 tablet by mouth twice a day as needed for constipation.  6.  Amlodipine 2.5 mg daily.  7.  Prednisone 20 mg by mouth for 2 days, then 15 mg by mouth for 5 days, then 10 mg by mouth for 5 days, then 5 mg by mouth for 5 days, then stop.  This is a prednisone taper that was prescribed prior to admission by dermatology for  treatment of dermatitis.  8.  Eliquis 5 mg twice a day.  9.  Atenolol 100 mg daily.  10.  Benadryl nightly as needed for itching or allergies.  11.  Atarax at bedtime.  12.  Lisinopril 40 mg daily.  13.  Zoloft 25 mg every morning.    FOLLOWUP INSTRUCTIONS:    1.  Follow up with Orthopedic Surgery in 2 weeks after surgery.  The patient was provided phone number to make an appointment.  2.  Follow up with your primary MD in 1-2 weeks after discharge from TCU.    I examined this patient on day of discharge.    Gene Schmitt MD        D: 2023   T: 2023   MT: OMAYRA    Name:     NELLY GILMORE  MRN:      [id number]        Account:      [id number]   :      1950           Service Date: 2023                                  Discharge Date: 2023     Document: [id number]

## 2023-02-16 NOTE — PLAN OF CARE
Physical Therapy Discharge Summary    Reason for therapy discharge:    Discharged to transitional care facility.    Progress towards therapy goal(s). See [facility] on [facility] for goal details.  Goals not met.  Barriers to achieving goals:   discharge from facility.    Therapy recommendation(s):    Continued therapy is recommended.  Rationale/Recommendations:  Pt now open to TCU; recommend d/c to facility for strengthening and improvement in functional mobility while pt NWB.

## 2023-02-16 NOTE — PROGRESS NOTES
Care Management Discharge Note    Discharge Date: 02/16/2023       Discharge Disposition: TCU    Discharge Services: None    Discharge DME: Walker    Discharge Transportation: family or friend will provide    Private pay costs discussed: Not applicable    PAS Confirmation Code:  [id number]  Patient/family educated on Medicare website which has current facility and service quality ratings: yes    Patient/Family in Agreement with the Plan: yes    Handoff Referral Completed: No    Additional Information:  PAS completed 2/16/23 at 0820.     YOGI Augustine, [facility]  Emergency Room   [phone]-Please contact the SW on the floor in which the patient is staying for any questions or concerns

## 2023-06-25 ENCOUNTER — HEALTH MAINTENANCE LETTER (OUTPATIENT)
Age: 73
End: 2023-06-25

## 2024-03-31 ENCOUNTER — HEALTH MAINTENANCE LETTER (OUTPATIENT)
Age: 74
End: 2024-03-31

## 2024-08-18 ENCOUNTER — HEALTH MAINTENANCE LETTER (OUTPATIENT)
Age: 74
End: 2024-08-18

## (undated) DEVICE — STPL SKIN 35W 6.9MM  PXW35

## (undated) DEVICE — FORCEP BIOPSY DISP 000386

## (undated) DEVICE — Device

## (undated) DEVICE — DRSG AQUACEL AG HYDROFIBER  3.5X10" 422605

## (undated) DEVICE — SOL WATER IRRIG 1000ML BOTTLE 2F7114

## (undated) DEVICE — LINEN DRAPE 54X72" 5467

## (undated) DEVICE — TOURNIQUET SGL BLADDER 34"X4" PURPLE 5921-034-135

## (undated) DEVICE — GLOVE BIOGEL PI MICRO SZ 8.5 48585

## (undated) DEVICE — ESU PENCIL W/HOLSTER E2350H

## (undated) DEVICE — GLOVE PROTEXIS W/NEU-THERA 7.0  2D73TE70

## (undated) DEVICE — DRSG ABDOMINAL 07 1/2X8" 7197D

## (undated) DEVICE — TUBING SUCTION MEDI-VAC 1/4"X20' N620A - HE

## (undated) DEVICE — PAD FOAM TRIANGLE KNEE 193-P

## (undated) DEVICE — PACK LOWER EXTREMITY RIDGES

## (undated) DEVICE — SNARE OVAL SMALL DISP 100600

## (undated) DEVICE — SU VICRYL 2-0 CT-2 27" UND J269H

## (undated) DEVICE — DRILL BIT STRK 3.2X216MM NON-LOCKING SHORT 705032

## (undated) DEVICE — SPONGE LAP 18X18" X8435

## (undated) DEVICE — DRAPE STOCKINETTE IMPERVIOUS 12" 1587

## (undated) DEVICE — DRSG GAUZE 4X8"

## (undated) DEVICE — DRAPE SHOULDER PACK SPLITS 29365

## (undated) DEVICE — DRAPE CONVERTORS U-DRAPE 60X72" 8476

## (undated) DEVICE — DRAPE X-RAY TUBE 00-901169-01-OEC

## (undated) DEVICE — LINEN FULL SHEET 5511

## (undated) DEVICE — LINEN ORTHO ACL PACK 5447

## (undated) DEVICE — SU ETHIBOND 0 CT-1 CR 8X18" CX21D

## (undated) DEVICE — DRSG STERI STRIP 1/2X4" R1547

## (undated) DEVICE — SU VICRYL 0 CT-2 27" J334H

## (undated) DEVICE — HANDPIECE INTERPULSE W/HIGH FLOW TIP & SUCTION 0210918000

## (undated) DEVICE — GUIDEWIRE BALL TIP 3.0X1000MM 1806-0085S

## (undated) DEVICE — REAMER SHAFT MODIFIED TRINKLE 8X510MM 0227-8510S

## (undated) DEVICE — GLOVE BIOGEL PI SZ 8.5 40885

## (undated) DEVICE — DRILL BIT STRK 4.3X216MM LOCKING SHORT 705043

## (undated) DEVICE — GLOVE BIOGEL PI SZ 7.0 40870

## (undated) DEVICE — PREP CHLORAPREP 26ML TINTED HI-LITE ORANGE 930815

## (undated) DEVICE — GLOVE BIOGEL PI MICRO INDICATOR UNDERGLOVE SZ 7.0 48970

## (undated) DEVICE — K-WIRE, 3 X 285MM

## (undated) DEVICE — DRAPE C-ARMOR 5 SIDED 5523

## (undated) DEVICE — BIT DRL 185MM 4.2MM FRHD STRL LF REUSE

## (undated) DEVICE — SUCTION MANIFOLD NEPTUNE 2 SYS 4 PORT 0702-020-000

## (undated) DEVICE — BLADE KNIFE SURG 10 371110

## (undated) DEVICE — ESU GROUND PAD ADULT W/CORD E7507

## (undated) DEVICE — BNDG ELASTIC 6" DBL LENGTH UNSTERILE 6611-16

## (undated) DEVICE — GLOVE BIOGEL PI MICRO INDICATOR UNDERGLOVE SZ 8.5 48985

## (undated) DEVICE — SUCTION MANIFOLD NEPTUNE 2 SYS 1 PORT 702-025-000

## (undated) DEVICE — BAG CLEAR TRASH 1.3M 39X33" P4040C

## (undated) RX ORDER — DEXAMETHASONE SODIUM PHOSPHATE 10 MG/ML
INJECTION, EMULSION INTRAMUSCULAR; INTRAVENOUS
Status: DISPENSED
Start: 2022-02-01

## (undated) RX ORDER — VANCOMYCIN HYDROCHLORIDE 1 G/20ML
INJECTION, POWDER, LYOPHILIZED, FOR SOLUTION INTRAVENOUS
Status: DISPENSED
Start: 2023-02-13

## (undated) RX ORDER — PROPOFOL 10 MG/ML
INJECTION, EMULSION INTRAVENOUS
Status: DISPENSED
Start: 2022-02-01

## (undated) RX ORDER — LIDOCAINE HYDROCHLORIDE 10 MG/ML
INJECTION, SOLUTION EPIDURAL; INFILTRATION; INTRACAUDAL; PERINEURAL
Status: DISPENSED
Start: 2023-02-13

## (undated) RX ORDER — LIDOCAINE HYDROCHLORIDE 10 MG/ML
INJECTION, SOLUTION EPIDURAL; INFILTRATION; INTRACAUDAL; PERINEURAL
Status: DISPENSED
Start: 2022-02-01

## (undated) RX ORDER — HYDROMORPHONE HCL IN WATER/PF 6 MG/30 ML
PATIENT CONTROLLED ANALGESIA SYRINGE INTRAVENOUS
Status: DISPENSED
Start: 2023-02-13

## (undated) RX ORDER — FENTANYL CITRATE 50 UG/ML
INJECTION, SOLUTION INTRAMUSCULAR; INTRAVENOUS
Status: DISPENSED
Start: 2023-02-13

## (undated) RX ORDER — FENTANYL CITRATE-0.9 % NACL/PF 10 MCG/ML
PLASTIC BAG, INJECTION (ML) INTRAVENOUS
Status: DISPENSED
Start: 2023-02-13

## (undated) RX ORDER — LABETALOL HYDROCHLORIDE 5 MG/ML
INJECTION, SOLUTION INTRAVENOUS
Status: DISPENSED
Start: 2023-02-13

## (undated) RX ORDER — TRANEXAMIC ACID 10 MG/ML
INJECTION, SOLUTION INTRAVENOUS
Status: DISPENSED
Start: 2023-02-13

## (undated) RX ORDER — CEFAZOLIN SODIUM/WATER 2 G/20 ML
SYRINGE (ML) INTRAVENOUS
Status: DISPENSED
Start: 2023-02-13

## (undated) RX ORDER — FENTANYL CITRATE-0.9 % NACL/PF 10 MCG/ML
PLASTIC BAG, INJECTION (ML) INTRAVENOUS
Status: DISPENSED
Start: 2022-02-01

## (undated) RX ORDER — NEOSTIGMINE METHYLSULFATE 1 MG/ML
VIAL (ML) INJECTION
Status: DISPENSED
Start: 2023-02-13

## (undated) RX ORDER — ONDANSETRON 2 MG/ML
INJECTION INTRAMUSCULAR; INTRAVENOUS
Status: DISPENSED
Start: 2023-02-13

## (undated) RX ORDER — ONDANSETRON 2 MG/ML
INJECTION INTRAMUSCULAR; INTRAVENOUS
Status: DISPENSED
Start: 2022-02-01

## (undated) RX ORDER — GLYCOPYRROLATE 0.2 MG/ML
INJECTION INTRAMUSCULAR; INTRAVENOUS
Status: DISPENSED
Start: 2023-02-13

## (undated) RX ORDER — DEXAMETHASONE SODIUM PHOSPHATE 4 MG/ML
INJECTION, SOLUTION INTRA-ARTICULAR; INTRALESIONAL; INTRAMUSCULAR; INTRAVENOUS; SOFT TISSUE
Status: DISPENSED
Start: 2023-02-13

## (undated) RX ORDER — PROPOFOL 10 MG/ML
INJECTION, EMULSION INTRAVENOUS
Status: DISPENSED
Start: 2023-02-13